# Patient Record
Sex: FEMALE | Race: BLACK OR AFRICAN AMERICAN
[De-identification: names, ages, dates, MRNs, and addresses within clinical notes are randomized per-mention and may not be internally consistent; named-entity substitution may affect disease eponyms.]

---

## 2019-09-14 ENCOUNTER — HOSPITAL ENCOUNTER (INPATIENT)
Dept: HOSPITAL 72 - EMR | Age: 84
LOS: 13 days | Discharge: NURSING, REHAB OR LONG-TERM CARE | DRG: 689 | End: 2019-09-27
Payer: MEDICARE

## 2019-09-14 VITALS
WEIGHT: 95 LBS | HEIGHT: 61 IN | SYSTOLIC BLOOD PRESSURE: 142 MMHG | DIASTOLIC BLOOD PRESSURE: 73 MMHG | BODY MASS INDEX: 17.94 KG/M2

## 2019-09-14 DIAGNOSIS — R13.10: ICD-10-CM

## 2019-09-14 DIAGNOSIS — Z88.8: ICD-10-CM

## 2019-09-14 DIAGNOSIS — B96.20: ICD-10-CM

## 2019-09-14 DIAGNOSIS — A04.72: ICD-10-CM

## 2019-09-14 DIAGNOSIS — R00.1: ICD-10-CM

## 2019-09-14 DIAGNOSIS — R62.7: ICD-10-CM

## 2019-09-14 DIAGNOSIS — E43: ICD-10-CM

## 2019-09-14 DIAGNOSIS — M24.50: ICD-10-CM

## 2019-09-14 DIAGNOSIS — K29.70: ICD-10-CM

## 2019-09-14 DIAGNOSIS — N39.0: Primary | ICD-10-CM

## 2019-09-14 DIAGNOSIS — I10: ICD-10-CM

## 2019-09-14 DIAGNOSIS — J45.909: ICD-10-CM

## 2019-09-14 DIAGNOSIS — G93.41: ICD-10-CM

## 2019-09-14 DIAGNOSIS — E11.9: ICD-10-CM

## 2019-09-14 DIAGNOSIS — Z79.4: ICD-10-CM

## 2019-09-14 LAB
ADD MANUAL DIFF: NO
ALBUMIN SERPL-MCNC: 3.2 G/DL (ref 3.4–5)
ALBUMIN/GLOB SERPL: 0.8 {RATIO} (ref 1–2.7)
ALP SERPL-CCNC: 121 U/L (ref 46–116)
ALT SERPL-CCNC: 11 U/L (ref 12–78)
ANION GAP SERPL CALC-SCNC: 8 MMOL/L (ref 5–15)
APPEARANCE UR: CLEAR
APTT PPP: (no result) S
AST SERPL-CCNC: 17 U/L (ref 15–37)
BASOPHILS NFR BLD AUTO: 0.5 % (ref 0–2)
BILIRUB SERPL-MCNC: 0.6 MG/DL (ref 0.2–1)
BUN SERPL-MCNC: 18 MG/DL (ref 7–18)
CALCIUM SERPL-MCNC: 9.2 MG/DL (ref 8.5–10.1)
CHLORIDE SERPL-SCNC: 106 MMOL/L (ref 98–107)
CO2 SERPL-SCNC: 27 MMOL/L (ref 21–32)
CREAT SERPL-MCNC: 0.5 MG/DL (ref 0.55–1.3)
EOSINOPHIL NFR BLD AUTO: 0.6 % (ref 0–3)
ERYTHROCYTE [DISTWIDTH] IN BLOOD BY AUTOMATED COUNT: 12.5 % (ref 11.6–14.8)
GLOBULIN SER-MCNC: 4.1 G/DL
GLUCOSE UR STRIP-MCNC: NEGATIVE MG/DL
HCT VFR BLD CALC: 42.8 % (ref 37–47)
HGB BLD-MCNC: 14.2 G/DL (ref 12–16)
KETONES UR QL STRIP: NEGATIVE
LEUKOCYTE ESTERASE UR QL STRIP: (no result)
LYMPHOCYTES NFR BLD AUTO: 24.9 % (ref 20–45)
MCV RBC AUTO: 92 FL (ref 80–99)
MONOCYTES NFR BLD AUTO: 8.1 % (ref 1–10)
NEUTROPHILS NFR BLD AUTO: 66 % (ref 45–75)
NITRITE UR QL STRIP: NEGATIVE
PH UR STRIP: 5 [PH] (ref 4.5–8)
PLATELET # BLD: 213 K/UL (ref 150–450)
POTASSIUM SERPL-SCNC: 4 MMOL/L (ref 3.5–5.1)
PROT UR QL STRIP: NEGATIVE
RBC # BLD AUTO: 4.67 M/UL (ref 4.2–5.4)
SODIUM SERPL-SCNC: 141 MMOL/L (ref 136–145)
SP GR UR STRIP: 1.01 (ref 1–1.03)
UROBILINOGEN UR-MCNC: NORMAL MG/DL (ref 0–1)
WBC # BLD AUTO: 8.9 K/UL (ref 4.8–10.8)

## 2019-09-14 PROCEDURE — 83036 HEMOGLOBIN GLYCOSYLATED A1C: CPT

## 2019-09-14 PROCEDURE — 87086 URINE CULTURE/COLONY COUNT: CPT

## 2019-09-14 PROCEDURE — 80061 LIPID PANEL: CPT

## 2019-09-14 PROCEDURE — 85025 COMPLETE CBC W/AUTO DIFF WBC: CPT

## 2019-09-14 PROCEDURE — 83605 ASSAY OF LACTIC ACID: CPT

## 2019-09-14 PROCEDURE — 36415 COLL VENOUS BLD VENIPUNCTURE: CPT

## 2019-09-14 PROCEDURE — 96361 HYDRATE IV INFUSION ADD-ON: CPT

## 2019-09-14 PROCEDURE — 85730 THROMBOPLASTIN TIME PARTIAL: CPT

## 2019-09-14 PROCEDURE — 83880 ASSAY OF NATRIURETIC PEPTIDE: CPT

## 2019-09-14 PROCEDURE — 80048 BASIC METABOLIC PNL TOTAL CA: CPT

## 2019-09-14 PROCEDURE — 84100 ASSAY OF PHOSPHORUS: CPT

## 2019-09-14 PROCEDURE — 94003 VENT MGMT INPAT SUBQ DAY: CPT

## 2019-09-14 PROCEDURE — 81003 URINALYSIS AUTO W/O SCOPE: CPT

## 2019-09-14 PROCEDURE — 99285 EMERGENCY DEPT VISIT HI MDM: CPT

## 2019-09-14 PROCEDURE — 71045 X-RAY EXAM CHEST 1 VIEW: CPT

## 2019-09-14 PROCEDURE — 82962 GLUCOSE BLOOD TEST: CPT

## 2019-09-14 PROCEDURE — 80053 COMPREHEN METABOLIC PANEL: CPT

## 2019-09-14 PROCEDURE — 87324 CLOSTRIDIUM AG IA: CPT

## 2019-09-14 PROCEDURE — 87181 SC STD AGAR DILUTION PER AGT: CPT

## 2019-09-14 PROCEDURE — 87040 BLOOD CULTURE FOR BACTERIA: CPT

## 2019-09-14 PROCEDURE — 84443 ASSAY THYROID STIM HORMONE: CPT

## 2019-09-14 PROCEDURE — 83735 ASSAY OF MAGNESIUM: CPT

## 2019-09-14 PROCEDURE — 85610 PROTHROMBIN TIME: CPT

## 2019-09-14 PROCEDURE — 96365 THER/PROPH/DIAG IV INF INIT: CPT

## 2019-09-14 PROCEDURE — 93005 ELECTROCARDIOGRAM TRACING: CPT

## 2019-09-14 PROCEDURE — 94150 VITAL CAPACITY TEST: CPT

## 2019-09-14 PROCEDURE — 87081 CULTURE SCREEN ONLY: CPT

## 2019-09-14 PROCEDURE — 82140 ASSAY OF AMMONIA: CPT

## 2019-09-14 NOTE — NUR
ED Nurse Note:

pt brought in by [facility] unit # [id number]. per EMS, pt was transferred from [facility] d/t FTT x 1 day. pt had low grade fever and was given 500mg of 
tylenol.

## 2019-09-14 NOTE — NUR
ED Nurse Note:



pt on cardiac monitor, iv line established, pt was cleaned and dressed, linens 
changed. blood and urine sample sent to lab. bed placed at lowest position. x 2 
 rails.

## 2019-09-14 NOTE — NUR
ED Nurse Note:



pt sent up with estella, emt. pt on room air, vss, pt has no belongings. pt 
shows no acute signs of distress. family at bedside.

## 2019-09-14 NOTE — EMERGENCY ROOM REPORT
History of Present Illness


General


Chief Complaint:  General Complaint


Source:  Medical Record





Present Illness


HPI


84-year-old female presents ED for evaluation.  Brought in by EMS from skilled 

nursing facility.  Reportedly having reduced appetite with possible fever at 

facility over the last few days.  Patient nonverbal at baseline.  Unable to 

provide any additional history at this time.  No reported signs of distress on 

arrival.  No nausea or vomiting.  No cough or congestion.  No other aggravating 

relieving factors.  No other associated symptoms


Allergies:  


Coded Allergies:  


     LISINOPRIL (Verified  Allergy, Unknown, 9/14/19)





Patient History


Past Medical History:  DM


Past Surgical History:  none


Pertinent Family History:  none


Social History:  Denies: smoking, alcohol use, drug use


Pregnant Now:  No


Immunizations:  UTD


Reviewed Nursing Documentation:  PMH: Agreed; PSxH: Agreed





Nursing Documentation-PMH


Past Medical History:  No History, Except For


Hx Diabetes:  Yes





Review of Systems


All Other Systems:  limited





Physical Exam





Vital Signs








  Date Time  Temp Pulse Resp B/P (MAP) Pulse Ox O2 Delivery O2 Flow Rate FiO2


 


9/14/19 21:45 99.5 52 16 142/73 (96) 97 Room Air  








Sp02 EP Interpretation:  reviewed, normal


General Appearance:  cachetic, lethargic, thin


Head:  normocephalic


Eyes:  bilateral eye normal inspection, bilateral eye PERRL


ENT:  normal ENT inspection


Neck:  normal inspection


Respiratory:  chest non-tender, lungs clear, normal breath sounds, speaking 

full sentences


Cardiovascular #1:  regular rate, rhythm, no edema


Gastrointestinal:  normal bowel sounds, non tender, soft, non-distended, no 

guarding, no rebound


Rectal:  deferred


Genitourinary:  no CVA tenderness


Musculoskeletal:  normal inspection


Neurologic:  other - nonverbal


Psychiatric:  other - nonverbal


Skin:  other - see nursing notes


Lymphatic:  normal inspection





Medical Decision Making


Diagnostic Impression:  


 Primary Impression:  


 Failure to thrive


 Qualified Codes:  R62.7 - Adult failure to thrive


 Additional Impressions:  


 Weakness


 UTI (urinary tract infection)


 Qualified Codes:  N39.0 - Urinary tract infection, site not specified


ER Course


Hospital Course 


84-year-old female presenting to ED with generalized weakness, poor appetite, 

reported fever 





Differential diagnoses include: Pneumonia, UTI, sepsis, dehydration, MI/

unstable angina, failure to thrive





Clinical course


Patient placed on stretcher.  On cardiac monitor with stable vitals.  After 

initial history and physical, I ordered labs, IV fluids, EKG, chest x-ray, 

blood cultures, UA. 





Labs - no leukocytosis, hb/hct stable, electrolytes ok, UA + bacteria


EKG - NSR, no acute ischemic changes interpreted by me 


CXR - no acute process 





IVFs + abx given. 





Case discussed with Dr Pineda and they agreed to admit patient to their service 

for further care and support





I feel this is a highly complex case requiring extensive working including EKG/

Rhythm strip, Xray/CT/US, Blood/urine lab work, repeat exams while in ED, and 

administration of strong opiates/narcotics for pain control, admission to 

hospital or close patient follow up.  





Diagnosis - failure to thrive, weakness, UTI





Patient admitted to floor in serious condition





Labs








Test


  9/14/19


22:06 9/14/19


22:10


 


Urine Color Pale yellow  


 


Urine Appearance Clear  


 


Urine pH 5 (4.5-8.0)  


 


Urine Specific Gravity


  1.010


(1.005-1.035) 


 


 


Urine Protein


  Negative


(NEGATIVE) 


 


 


Urine Glucose (UA)


  Negative


(NEGATIVE) 


 


 


Urine Ketones


  Negative


(NEGATIVE) 


 


 


Urine Blood 1+ (NEGATIVE)  


 


Urine Nitrite


  Negative


(NEGATIVE) 


 


 


Urine Bilirubin


  Negative


(NEGATIVE) 


 


 


Urine Urobilinogen


  Normal MG/DL


(0.0-1.0) 


 


 


Urine Leukocyte Esterase 3+ (NEGATIVE)  


 


Urine RBC


  0-2 /HPF (0 -


2) 


 


 


Urine WBC


  30-40 /HPF (0


- 2) 


 


 


Urine Squamous Epithelial


Cells None /LPF


(NONE/OCC) 


 


 


Urine Bacteria


  Moderate /HPF


(NONE) 


 


 


White Blood Count


  


  8.9 K/UL


(4.8-10.8)


 


Red Blood Count


  


  4.67 M/UL


(4.20-5.40)


 


Hemoglobin


  


  14.2 G/DL


(12.0-16.0)


 


Hematocrit


  


  42.8 %


(37.0-47.0)


 


Mean Corpuscular Volume  92 FL (80-99) 


 


Mean Corpuscular Hemoglobin


  


  30.5 PG


(27.0-31.0)


 


Mean Corpuscular Hemoglobin


Concent 


  33.3 G/DL


(32.0-36.0)


 


Red Cell Distribution Width


  


  12.5 %


(11.6-14.8)


 


Platelet Count


  


  213 K/UL


(150-450)


 


Mean Platelet Volume


  


  6.8 FL


(6.5-10.1)


 


Neutrophils (%) (Auto)


  


  66.0 %


(45.0-75.0)


 


Lymphocytes (%) (Auto)


  


  24.9 %


(20.0-45.0)


 


Monocytes (%) (Auto)


  


  8.1 %


(1.0-10.0)


 


Eosinophils (%) (Auto)


  


  0.6 %


(0.0-3.0)


 


Basophils (%) (Auto)


  


  0.5 %


(0.0-2.0)


 


Sodium Level


  


  141 MMOL/L


(136-145)


 


Potassium Level


  


  4.0 MMOL/L


(3.5-5.1)


 


Chloride Level


  


  106 MMOL/L


()


 


Carbon Dioxide Level


  


  27 MMOL/L


(21-32)


 


Anion Gap


  


  8 mmol/L


(5-15)


 


Blood Urea Nitrogen


  


  18 mg/dL


(7-18)


 


Creatinine


  


  0.5 MG/DL


(0.55-1.30)


 


Estimat Glomerular Filtration


Rate 


   mL/min (>60) 


 


 


Glucose Level


  


  95 MG/DL


()


 


Lactic Acid Level


  


  0.80 mmol/L


(0.4-2.0)


 


Calcium Level


  


  9.2 MG/DL


(8.5-10.1)


 


Total Bilirubin


  


  0.6 MG/DL


(0.2-1.0)


 


Aspartate Amino Transf


(AST/SGOT) 


  17 U/L (15-37) 


 


 


Alanine Aminotransferase


(ALT/SGPT) 


  11 U/L (12-78) 


 


 


Alkaline Phosphatase


  


  121 U/L


()


 


Pro-B-Type Natriuretic Peptide


  


  501 pg/mL


(0-125)


 


Total Protein


  


  7.3 G/DL


(6.4-8.2)


 


Albumin


  


  3.2 G/DL


(3.4-5.0)


 


Globulin  4.1 g/dL 


 


Albumin/Globulin Ratio  0.8 (1.0-2.7) 








EKG Diagnostic Results


Rate:  normal


Rhythm:  NSR


ST Segments:  no acute changes


ASA given to the pt in ED:  No





Rhythm Strip Diag. Results


EP Interpretation:  yes


Rhythm:  NSR, no PVC's, no ectopy





Chest X-Ray Diagnostic Results


Chest X-Ray Diagnostic Results :  


   Chest X-Ray Ordered:  Yes


   # of Views/Limited/Complete:  1 View


   Indication:  Other


   EP Interpretation:  Yes


   Interpretation:  no consolidation, no effusion, no pneumothorax, no acute 

cardiopulmonary disease


   Impression:  No acute disease


   Electronically Signed by:  Electronically signed by Isaac Frias MD





Last Vital Signs








  Date Time  Temp Pulse Resp B/P (MAP) Pulse Ox O2 Delivery O2 Flow Rate FiO2


 


9/14/19 21:45 99.5 52 16 142/73 (96) 97 Room Air  








Status:  improved


Disposition:  ADMITTED AS INPATIENT


Condition:  Serious











Isaac Frias MD Sep 14, 2019 22:34

## 2019-09-15 VITALS — SYSTOLIC BLOOD PRESSURE: 151 MMHG | DIASTOLIC BLOOD PRESSURE: 59 MMHG

## 2019-09-15 VITALS — DIASTOLIC BLOOD PRESSURE: 86 MMHG | SYSTOLIC BLOOD PRESSURE: 151 MMHG

## 2019-09-15 VITALS — DIASTOLIC BLOOD PRESSURE: 72 MMHG | SYSTOLIC BLOOD PRESSURE: 161 MMHG

## 2019-09-15 VITALS — SYSTOLIC BLOOD PRESSURE: 174 MMHG | DIASTOLIC BLOOD PRESSURE: 67 MMHG

## 2019-09-15 VITALS — SYSTOLIC BLOOD PRESSURE: 103 MMHG | DIASTOLIC BLOOD PRESSURE: 71 MMHG

## 2019-09-15 VITALS — SYSTOLIC BLOOD PRESSURE: 133 MMHG | DIASTOLIC BLOOD PRESSURE: 75 MMHG

## 2019-09-15 RX ADMIN — LOSARTAN POTASSIUM SCH MG: 25 TABLET, FILM COATED ORAL at 12:01

## 2019-09-15 RX ADMIN — HEPARIN SODIUM SCH UNITS: 5000 INJECTION INTRAVENOUS; SUBCUTANEOUS at 08:53

## 2019-09-15 RX ADMIN — INSULIN ASPART SCH UNITS: 100 INJECTION, SOLUTION INTRAVENOUS; SUBCUTANEOUS at 17:10

## 2019-09-15 RX ADMIN — SODIUM CHLORIDE SCH MLS/HR: 0.9 INJECTION INTRAVENOUS at 21:35

## 2019-09-15 RX ADMIN — INSULIN ASPART SCH UNITS: 100 INJECTION, SOLUTION INTRAVENOUS; SUBCUTANEOUS at 21:00

## 2019-09-15 RX ADMIN — HEPARIN SODIUM SCH UNITS: 5000 INJECTION INTRAVENOUS; SUBCUTANEOUS at 21:36

## 2019-09-15 RX ADMIN — INSULIN ASPART SCH UNITS: 100 INJECTION, SOLUTION INTRAVENOUS; SUBCUTANEOUS at 11:30

## 2019-09-15 NOTE — CONSULTATION
History of Present Illness


General


Date patient seen:  Sep 15, 2019


Time patient seen:  10:05


Chief Complaint:  General Complaint


Referring physician:  Dr Pineda


Reason for Consultation:  hospitalist





Present Illness


HPI


84 year, resident of skilled nursing facility, with past medical history of HTN

, asthma, diabetes mellitus, DNR status, presented with reported fever and 

reduced appetite for the several days.


Patient nonverbal at baseline and unable to provide any history.  No nausea or 

vomiting were reported.  No cough or congestion were reported.  Upon evaluation 

patient had low-grade fever 99.5 was bradycardic with a heart rate 52.  EKG 

reveals sinus bradycardia no acute ischemic changes.  Chest x-ray revealed no 

acute cardiopulmonary pathology.  Urinalysis revealed pyuria moderate bacteria 

laboratory work-up revealed no leukocytosis stable hemoglobin hematocrit.  

Stable chemistry and renal parameters.  Glucose 95.  Lactic acid 0.8.  Stable 

LFT.  proBNP 501 albumin 3.2 in the emergency department patient started on the 

IV fluids and empiric antibiotics admitted for further management.  S old female


Allergies:  


Coded Allergies:  


     LISINOPRIL (Verified  Allergy, Unknown, 9/14/19)





Medication History


Scheduled


Cyanocobalamin (Vitamin B-12), 500 MCG ORAL DAILY, (Reported)


Docusate Sodium* (Colace*), 100 MG ORAL DAILY, (Reported)


Dorzolamide Hcl/Timolol Maleat (Cosopt Eye Drops), 10 ML OP BID, (Reported)


Insulin Regular, Human* (Novolin R*), 0 SUBQ .SLIDING SCALE, (Reported)


Magnesium Hydroxide* (Milk Of Magnesia*), 30 ML ORAL DAILY, (Reported)


Mirtazapine (Remeron), 15 MG ORAL BEDTIME, (Reported)


Mirtazapine* (Remeron*), 7.5 MG ORAL BEDTIME, (Reported)


Multivitamin With Minerals (Multivitamins With Minerals*), 1 TAB ORAL DAILY, (

Reported)


Potassium Chloride (Potassium Chloride), 7.5 ML ORAL QHS, (Reported)





Scheduled PRN


Acetaminophen* (Acetaminophen 325MG Tablet*), 650 MG ORAL Q4H PRN for Mild Pain 

(Pain Scale 1-3), (Reported)


Acetaminophen* (Acetaminophen Extra Strength*), 1,000 MG ORAL Q4H PRN for 

Moderate Pain (Pain Scale 4-6), (Reported)


Na Phos,M-B/Na Phos,Di-Ba (Fleet Enema), 133 ML RC PRN PRN for Constipation, (

Reported)





Miscellaneous Medications


Bisacodyl (Dulcolax), 10 MG RC, (Reported)





Patient History


History Provided By:  Medical Record


Healthcare decision maker


Bi Miller


Resuscitation status


Do Not Resuscitate


Advanced Directive on File


No





Review of Systems


ROS Narrative


unable to obtain due to nonverbal status





Physical Exam


General Appearance:  no apparent distress, cachetic, other - awake, nonverbal 

AA female in NAD


Lines, tubes and drains:  peripheral


HEENT:  normocephalic, atraumatic, anicteric


Neck:  non-tender, supple


Respiratory/Chest:  lungs clear, no respiratory distress, no accessory muscle 

use


Cardiovascular/Chest:  bradycardia


Abdomen:  normal bowel sounds, non tender, soft


Extremities:  no calf tenderness, no edema, other - contracted BLE


Neurologic:  abnormal gait, alert - nonverbal


Musculoskeletal:  atrophy





Last 24 Hour Vital Signs








  Date Time  Temp Pulse Resp B/P (MAP) Pulse Ox O2 Delivery O2 Flow Rate FiO2


 


9/15/19 08:00 98.4 60 17 161/72 (101) 96   


 


9/15/19 04:00 97.3 52 18 151/59 (89) 99   


 


9/15/19 01:50      Room Air  


 


9/15/19 00:00 97.3 73 16 133/75 (94) 99   


 


9/14/19 23:50 99.2 56 13 140/69 100 Room Air  


 


9/14/19 21:53 99.5 56 22 142/73 98 Room Air  


 


9/14/19 21:53  56 22   Room Air  


 


9/14/19 21:45 99.5 52 16 142/73 (96) 97 Room Air  

















Intake and Output  


 


 9/14/19 9/15/19





 19:00 07:00


 


Intake Total  1000 ml


 


Output Total  450 ml


 


Balance  550 ml


 


  


 


Intake Oral  0 ml


 


IV Total  1000 ml


 


Output Urine Total  450 ml


 


# Voids  1


 


# Bowel Movements  4











Laboratory Tests








Test


  9/14/19


22:06 9/14/19


22:10


 


Urine Color Pale yellow   


 


Urine Appearance Clear   


 


Urine pH 5 (4.5-8.0)   


 


Urine Specific Gravity


  1.010


(1.005-1.035) 


 


 


Urine Protein


  Negative


(NEGATIVE) 


 


 


Urine Glucose (UA)


  Negative


(NEGATIVE) 


 


 


Urine Ketones


  Negative


(NEGATIVE) 


 


 


Urine Blood


  1+ (NEGATIVE)


H 


 


 


Urine Nitrite


  Negative


(NEGATIVE) 


 


 


Urine Bilirubin


  Negative


(NEGATIVE) 


 


 


Urine Urobilinogen


  Normal MG/DL


(0.0-1.0) 


 


 


Urine Leukocyte Esterase


  3+ (NEGATIVE)


H 


 


 


Urine RBC


  0-2 /HPF (0 -


2) 


 


 


Urine WBC


  30-40 /HPF (0


- 2)  H 


 


 


Urine Squamous Epithelial


Cells None /LPF


(NONE/OCC) 


 


 


Urine Bacteria


  Moderate /HPF


(NONE)  H 


 


 


White Blood Count


  


  8.9 K/UL


(4.8-10.8)


 


Red Blood Count


  


  4.67 M/UL


(4.20-5.40)


 


Hemoglobin


  


  14.2 G/DL


(12.0-16.0)


 


Hematocrit


  


  42.8 %


(37.0-47.0)


 


Mean Corpuscular Volume  92 FL (80-99)  


 


Mean Corpuscular Hemoglobin


  


  30.5 PG


(27.0-31.0)


 


Mean Corpuscular Hemoglobin


Concent 


  33.3 G/DL


(32.0-36.0)


 


Red Cell Distribution Width


  


  12.5 %


(11.6-14.8)


 


Platelet Count


  


  213 K/UL


(150-450)


 


Mean Platelet Volume


  


  6.8 FL


(6.5-10.1)


 


Neutrophils (%) (Auto)


  


  66.0 %


(45.0-75.0)


 


Lymphocytes (%) (Auto)


  


  24.9 %


(20.0-45.0)


 


Monocytes (%) (Auto)


  


  8.1 %


(1.0-10.0)


 


Eosinophils (%) (Auto)


  


  0.6 %


(0.0-3.0)


 


Basophils (%) (Auto)


  


  0.5 %


(0.0-2.0)


 


Sodium Level


  


  141 MMOL/L


(136-145)


 


Potassium Level


  


  4.0 MMOL/L


(3.5-5.1)


 


Chloride Level


  


  106 MMOL/L


()


 


Carbon Dioxide Level


  


  27 MMOL/L


(21-32)


 


Anion Gap


  


  8 mmol/L


(5-15)


 


Blood Urea Nitrogen


  


  18 mg/dL


(7-18)


 


Creatinine


  


  0.5 MG/DL


(0.55-1.30)  L


 


Estimat Glomerular Filtration


Rate 


   mL/min (>60)  


 


 


Glucose Level


  


  95 MG/DL


()


 


Lactic Acid Level


  


  0.80 mmol/L


(0.4-2.0)


 


Calcium Level


  


  9.2 MG/DL


(8.5-10.1)


 


Total Bilirubin


  


  0.6 MG/DL


(0.2-1.0)


 


Aspartate Amino Transf


(AST/SGOT) 


  17 U/L (15-37)


 


 


Alanine Aminotransferase


(ALT/SGPT) 


  11 U/L (12-78)


L


 


Alkaline Phosphatase


  


  121 U/L


()  H


 


Pro-B-Type Natriuretic Peptide


  


  501 pg/mL


(0-125)  H


 


Total Protein


  


  7.3 G/DL


(6.4-8.2)


 


Albumin


  


  3.2 G/DL


(3.4-5.0)  L


 


Globulin  4.1 g/dL  


 


Albumin/Globulin Ratio


  


  0.8 (1.0-2.7)


L











Microbiology








 Date/Time


Source Procedure


Growth Status


 


 


 9/14/19 22:30


Rectum  Received








Height (Feet):  5


Height (Inches):  1.00


Weight (Pounds):  101


Medications





Current Medications








 Medications


  (Trade)  Dose


 Ordered  Sig/Trent


 Route


 PRN Reason  Start Time


 Stop Time Status Last Admin


Dose Admin


 


 Acetaminophen


  (Tylenol)  650 mg  Q4H  PRN


 ORAL


 fever  9/15/19 08:30


 10/15/19 08:29   


 


 


 Ceftriaxone


 Sodium 1 gm/


 Dextrose  55 ml @ 


 110 mls/hr  Q24H


 IVPB


   9/15/19 21:00


 9/22/19 20:59   


 


 


 Dextrose


  (Dextrose 50%)  25 ml  Q30M  PRN


 IV


 Hypoglycemia  9/15/19 08:30


 10/15/19 08:29   


 


 


 Dextrose


  (Dextrose 50%)  50 ml  Q30M  PRN


 IV


 Hypoglycemia  9/15/19 08:30


 10/15/19 08:29   


 


 


 Heparin Sodium


  (Porcine)


  (Heparin 5000


 units/ml)  5,000 units  EVERY 12  HOURS


 SUBQ


   9/15/19 09:00


 10/15/19 08:59  9/15/19 08:53


 


 


 Lorazepam


  (Ativan 2mg/ml


 1ml)  0.5 mg  Q4H  PRN


 IV


 For Anxiety  9/15/19 08:30


 9/22/19 08:29   


 


 


 Morphine Sulfate


  (Morphine


 Sulfate)  1 mg  Q4H  PRN


 IVP


 For Pain  9/15/19 08:30


 9/22/19 08:29   


 


 


 Ondansetron HCl


  (Zofran)  4 mg  Q6H  PRN


 IVP


 Nausea & Vomiting  9/15/19 08:30


 10/15/19 08:29   


 


 


 Polyethylene


 Glycol


  (Miralax)  17 gm  HSPRN  PRN


 ORAL


 Constipation  9/15/19 08:30


 10/15/19 08:29   


 


 


 Zolpidem Tartrate


  (Ambien)  5 mg  HSPRN  PRN


 ORAL


 Insomnia  9/15/19 08:30


 9/22/19 08:29   


 











Assessment/Plan


Assessment/Plan:


ASSESSMENT


UTI


Cachexia, likely protein calorie malnutrition  


HTN 


Bradycardia  


Asthma 


Contractures   





PLAN OF CARE 


MS floor 


gentle IVF


empiric abx,  fup with cx


dietary eval


BP management- add Cozaar, along with Clonidine prn, optimize further as needed 


ECG this am due to berenice 


O2 HHN prn, no evidence of asthma exacerbation 


DVT prophylaxis  


BS management with SSI sensitive check HgA1c 


swallow eval 


asp precautions 


PT eval and Rx   


bowel regimen 


supportive care   








case discussed and evaluated by supervising physician











Ilsa Shin NP Sep 15, 2019 11:06

## 2019-09-15 NOTE — HISTORY AND PHYSICAL REPORT
DATE OF ADMISSION:  09/14/2019

DATE AND TIME SEEN:  09/15/2019 at 9 a.m.



CONSULTANTS:

1. Chau Fitzgerald M.D.

2. Shai Garg M.D.

3. Luciano Hyatt M.D.



CHIEF COMPLAINTS:  Failure to thrive, weakness, wheezing, UTI.



BRIEF HISTORY:  This is an 84-year-old female from [facility], presented with the above-mentioned diagnoses, admitted to

medical floor for further treatment.  Currently, slightly confused, in

bed, sleepy, not talking much.



PAST MEDICAL HISTORY:  Include failure to thrive, weakness, _____, and

diabetes.



PAST SURGICAL HISTORY:  Unknown.



MEDICATIONS:  Include ceftriaxone, heparin, Tylenol, morphine, Zofran,

lorazepam, and zolpidem.



ALLERGIES:  Lisinopril.



SOCIAL HISTORY:  No smoking.  No alcohol.  No intravenous drug

abuse.



FAMILY HISTORY:  Noncontributory.



REVIEW OF SYSTEMS:  Unavailable.



PHYSICAL EXAMINATION:

GENERAL:  Lethargic in bed, nonverbal.

VITAL SIGNS:  Temperature 98 degrees, pulse 60, respirations 17, blood

pressure 161/72.

CARDIOVASCULAR:  No murmur.

LUNGS:  Distant and clear.

ABDOMEN:  Bowel sounds positive.  Nontender and nondistended.

EXTREMITIES:  Showed no cyanosis, clubbing or edema.

NEUROLOGIC:  The patient is flaccid in bed, not following

directions.



LABORATORY DATA:  Labs at this time show CBC is normal.  BMP, creatinine

0.5.  Alkaline phosphatase 121.  BNP is 501.  Albumin 3.2.  Urinalysis

shows 3+ leukocyte esterase.



ASSESSMENT:

1. Failure to thrive.

2. Weakness.

3. Slight wheezing.

4. UTI.

5. Diabetes.

6. Malnutrition.



PLAN:  Blood sugar control.  Antibiotic per Infectious Disease.  PT, OT,

and dietary followup.  Resume home medications.  CBC and BMP in the

morning.   _____.









  ______________________________________________

  Chino Pineda D.O.





DR:  DAVIAN

D:  09/15/2019 09:47

T:  09/16/2019 03:05

JOB#:  [id number]

CC:

## 2019-09-15 NOTE — NUR
NURSE NOTES:





Patient received from EMT Juan Carlos. On room air with no s/s of SOB. Yellow urine draining via 
Hollingsworth catheter. Right forearm 20g IV intact and asymptomatic. Small 1cm skin alteration 
found on coccyx area. Photos taken and uploaded to file. Patient is nonverbal and confused. 
She is bedbound with bilateral lower extremity contractures. Patient had loose stool x1. 
C.Diff sample was obtained and sent to lab. No admission orders at time. Called Doctor Edwin 
and endorsed to night staff. Reached out to doctor Amilcar reached out as well. Family at 
bedside.

## 2019-09-15 NOTE — NUR
NURSE NOTES:

Received pt from JESUS SEAMAN at 0730. Pt is confused and non verbal. pt is in RA, no SOB or 
acute respiratory distress noted. pt has intact iv access RFA 20G SL. Pt has Hollingsworth cath in 
place and running well. a CNA fed breakfast to pt. pt is on calorie count. Dr MANZANO is 
aware about admission and put admission orders. all needs attended, bed is locked and is in 
the lowest position. call light within easy reach. will continue to monitor.

## 2019-09-15 NOTE — CONSULTATION
DATE OF CONSULTATION:  09/15/2019

CONSULTING PHYSICIAN:  Shai Garg M.D.



CHIEF COMPLAINT:  Failure to thrive.



HISTORY OF PRESENT ILLNESS:  Most of the history was obtained per chart.

The patient is nonverbal.  The patient was brought by from the nursing

facility for decreased p.o. intake and altered.



PAST MEDICAL HISTORY:  Diabetes and hypertension.



PAST SURGICAL HISTORY:  Unknown.



ALLERGIES:  No known drug allergies.



MEDICATIONS:  Please see medication reconciliation list.



SOCIAL HISTORY:  Currently lives in a nursing home.  No recent history of

tobacco, alcohol, or drug abuse.



FAMILY HISTORY:  Noncontributory.



REVIEW OF SYSTEMS:  Unable to obtain.



PHYSICAL EXAMINATION:

VITAL SIGNS:  Temperature 97.3, pulse 52, respirations 18, blood pressure

is ______.

HEENT:  Normocephalic and atraumatic.  Sclerae anicteric.

NECK:  Supple.  No evidence of obvious lymphadenopathy.

CARDIOVASCULAR:  Regular rate and rhythm.  Plus S1 and S2.  No obvious

murmur.

LUNGS:  Decreased breath sounds bilaterally based on the supine

exam.

ABDOMEN:  Soft and nontender.  No rebound.  No guarding.  No peritoneal

sign.

EXTREMITIES:  No cyanosis.  No clubbing.  No edema.



LABORATORY DATA:  White count is 8.9, hemoglobin 14, hematocrit 42,

platelets are 213,000.  Chem-7 grossly normal.



ASSESSMENT AND PLAN:  This is an 84-year-old female, brought from the

nursing home, poor historian, seems that the patient has history of

hypertension at baseline and possible diabetes.  Per nurse, the patient is

_____ overnight.  Plan to treat her UTI, may be as a cause of altered

mental status for this elderly patient.  We will order swallow evaluation

to see if patient can swallow, meanwhile we will put her on pureed with

honey thick food.  If she passes the swallow, we are going to calculate

calorie count to see how much she eats and if she is not eating enough, we

will consider doing G-tube placement at that time.



I want to thank, Dr. Chino Pineda, for this kind referral.









  ______________________________________________

  Shai Garg M.D.





DR:  ALICIA

D:  09/15/2019 07:14

T:  09/15/2019 13:05

JOB#:  [id number]

CC:  Chino Pineda D.O.

## 2019-09-15 NOTE — NUR
NURSE NOTES:





Received report from JESUS Mcclain. Patient is resting in bed with IV fluids running to right 
forearm 20G. She is nonverbal and confused. Bilateral lower extremities are contractured. 
Bilateral heels are elevated and patient is turned to the right side. Partial linen change 
is done. No s/s of pain noted. Family is at bedside. Will continue to monitor.

## 2019-09-15 NOTE — NUR
RD ASSESSMENT & RECOMMENDATIONS

SEE CARE ACTIVITY FOR COMPLETE ASSESSMENT



DAILY ESTIMATED NEEDS:

Needs based on Wound, 46kg  

30-35  kcals/kg 

9802-8079  total kcals

1.25-1.5  g protein/kg

58-69  g total protein 

25-30  mL/kg

5209-7475  total fluid mLs



NUTRITION DIAGNOSIS:

Increased kcal and pro needs r/t wound healing as evidenced by pt w/

coccyx pressure ulcer, w/ generalized mild to moderate wasting.



CURRENT DIET: Regular puree w/ HTL     





PO DIET RECOMMENDATIONS:

Liberalized Regular diet / texture per SLP  





ADDITIONAL RECOMMENDATIONS:

1) Obtain calibrated bed scale wts 

2) F/up w/ kcal count  

3) Add Ensure Enlive TID w/ meals  

4) SLP for appropriate texture  

5) Wound care: add Melvin BID + Vit C 250mg daily  

    F/up w/ WC eval

## 2019-09-16 VITALS — DIASTOLIC BLOOD PRESSURE: 80 MMHG | SYSTOLIC BLOOD PRESSURE: 127 MMHG

## 2019-09-16 VITALS — SYSTOLIC BLOOD PRESSURE: 131 MMHG | DIASTOLIC BLOOD PRESSURE: 77 MMHG

## 2019-09-16 VITALS — SYSTOLIC BLOOD PRESSURE: 162 MMHG | DIASTOLIC BLOOD PRESSURE: 69 MMHG

## 2019-09-16 VITALS — SYSTOLIC BLOOD PRESSURE: 113 MMHG | DIASTOLIC BLOOD PRESSURE: 62 MMHG

## 2019-09-16 VITALS — DIASTOLIC BLOOD PRESSURE: 61 MMHG | SYSTOLIC BLOOD PRESSURE: 149 MMHG

## 2019-09-16 VITALS — DIASTOLIC BLOOD PRESSURE: 79 MMHG | SYSTOLIC BLOOD PRESSURE: 130 MMHG

## 2019-09-16 LAB
ADD MANUAL DIFF: NO
ALBUMIN SERPL-MCNC: 2.8 G/DL (ref 3.4–5)
ALBUMIN/GLOB SERPL: 0.7 {RATIO} (ref 1–2.7)
ALP SERPL-CCNC: 103 U/L (ref 46–116)
ALT SERPL-CCNC: 9 U/L (ref 12–78)
ANION GAP SERPL CALC-SCNC: 6 MMOL/L (ref 5–15)
AST SERPL-CCNC: 15 U/L (ref 15–37)
BASOPHILS NFR BLD AUTO: 0.9 % (ref 0–2)
BILIRUB SERPL-MCNC: 0.5 MG/DL (ref 0.2–1)
BUN SERPL-MCNC: 10 MG/DL (ref 7–18)
CALCIUM SERPL-MCNC: 8.5 MG/DL (ref 8.5–10.1)
CHLORIDE SERPL-SCNC: 107 MMOL/L (ref 98–107)
CHOLEST SERPL-MCNC: 247 MG/DL (ref ?–200)
CO2 SERPL-SCNC: 27 MMOL/L (ref 21–32)
CREAT SERPL-MCNC: 0.5 MG/DL (ref 0.55–1.3)
EOSINOPHIL NFR BLD AUTO: 1 % (ref 0–3)
ERYTHROCYTE [DISTWIDTH] IN BLOOD BY AUTOMATED COUNT: 11.8 % (ref 11.6–14.8)
GLOBULIN SER-MCNC: 3.8 G/DL
HCT VFR BLD CALC: 38.5 % (ref 37–47)
HDLC SERPL-MCNC: 60 MG/DL (ref 40–60)
HGB BLD-MCNC: 12.7 G/DL (ref 12–16)
LYMPHOCYTES NFR BLD AUTO: 22.7 % (ref 20–45)
MCV RBC AUTO: 92 FL (ref 80–99)
MONOCYTES NFR BLD AUTO: 9 % (ref 1–10)
NEUTROPHILS NFR BLD AUTO: 66.4 % (ref 45–75)
PLATELET # BLD: 161 K/UL (ref 150–450)
POTASSIUM SERPL-SCNC: 3.6 MMOL/L (ref 3.5–5.1)
RBC # BLD AUTO: 4.17 M/UL (ref 4.2–5.4)
SODIUM SERPL-SCNC: 140 MMOL/L (ref 136–145)
TRIGL SERPL-MCNC: 43 MG/DL (ref 30–150)
WBC # BLD AUTO: 6.4 K/UL (ref 4.8–10.8)

## 2019-09-16 RX ADMIN — SODIUM CHLORIDE SCH MLS/HR: 0.9 INJECTION INTRAVENOUS at 20:37

## 2019-09-16 RX ADMIN — HEPARIN SODIUM SCH UNITS: 5000 INJECTION INTRAVENOUS; SUBCUTANEOUS at 08:41

## 2019-09-16 RX ADMIN — INSULIN ASPART SCH UNITS: 100 INJECTION, SOLUTION INTRAVENOUS; SUBCUTANEOUS at 16:30

## 2019-09-16 RX ADMIN — INSULIN ASPART SCH UNITS: 100 INJECTION, SOLUTION INTRAVENOUS; SUBCUTANEOUS at 11:30

## 2019-09-16 RX ADMIN — HEPARIN SODIUM SCH UNITS: 5000 INJECTION INTRAVENOUS; SUBCUTANEOUS at 20:38

## 2019-09-16 RX ADMIN — LOSARTAN POTASSIUM SCH MG: 25 TABLET, FILM COATED ORAL at 08:40

## 2019-09-16 RX ADMIN — INSULIN ASPART SCH UNITS: 100 INJECTION, SOLUTION INTRAVENOUS; SUBCUTANEOUS at 06:30

## 2019-09-16 RX ADMIN — INSULIN ASPART SCH UNITS: 100 INJECTION, SOLUTION INTRAVENOUS; SUBCUTANEOUS at 20:38

## 2019-09-16 NOTE — NUR
NURSE NOTES:

Received report from JESUS Arana. Bed in low and locked position. Provided safe environment. 
IV site noted, IV fluid infusing. Skin is warm and dry to touch. Abdomen is soft and non 
distended. Patient is on a calorie count. No s/s of pain or discomfort noted. Respiration is 
even and unlabored.  Call light is at bedside. Will continue plan of care.

## 2019-09-16 NOTE — CARDIOLOGY REPORT
--------------- APPROVED REPORT --------------





EKG Measurement

Heart [id number]

NV [id number]

[id number]

[id number]

[id number]





Normal sinus rhythm

Nonspecific T wave abnormality

Abnormal ECG

## 2019-09-16 NOTE — GI PROGRESS NOTE
Assessment/Plan


Problems:  


(1) Weakness


ICD Codes:  R53.1 - Weakness


SNOMED:  85752661


(2) Malnutrition


ICD Codes:  E46 - Unspecified protein-calorie malnutrition


SNOMED:  46614492


(3) Failure to thrive


SNOMED:  84316449


Qualifiers:  


   Qualified Codes:  R62.7 - Adult failure to thrive


Status:  unchanged


Status Narrative


Discussed with Dr. Garg


Assessment/Plan


Follow-up swallow evaluation


Continue calorie count


Consider PEG if nutritional needs are not met


Push p.o.


Strict aspiration precautions


One-to-one feeder


Consider Marinol


Follow labs





The patient was seen and examined at bedside and all new and available data was 

reviewed in the patients chart. I agree with the above findings, impression 

and plan.  (Patient seen earlier today. Signature stamp does not reflect 

patient encounter time.). - Shai Garg MD





Subjective


Subjective


Limited





Objective





Last 24 Hour Vital Signs








  Date Time  Temp Pulse Resp B/P (MAP) Pulse Ox O2 Delivery O2 Flow Rate FiO2


 


9/16/19 09:00      Room Air  


 


9/16/19 08:40    130/79    


 


9/16/19 08:00 98.6 66 20 130/79 (96) 99   


 


9/16/19 04:00 98.6 55 18 162/69 (100) 96   


 


9/16/19 00:00 98.2 60 17 149/61 (90) 96   


 


9/15/19 21:35    174/67    


 


9/15/19 21:00      Room Air  


 


9/15/19 20:00 98.3 61 16 174/67 (102) 96   


 


9/15/19 16:00 98.3 69 18 103/71 (82) 97   


 


9/15/19 12:01    151/86    


 


9/15/19 11:56 98.1 73 17 151/86 (107) 97   

















Intake and Output  


 


 9/15/19 9/16/19





 19:00 07:00


 


Intake Total 850 ml 600 ml


 


Output Total 300 ml 600 ml


 


Balance 550 ml 0 ml


 


  


 


Intake Oral 500 ml 


 


IV Total 350 ml 600 ml


 


Output Urine Total 300 ml 600 ml


 


# Voids  1


 


# Bowel Movements 4 3











Laboratory Tests








Test


  9/16/19


04:45


 


White Blood Count


  6.4 K/UL


(4.8-10.8)


 


Red Blood Count


  4.17 M/UL


(4.20-5.40)  L


 


Hemoglobin


  12.7 G/DL


(12.0-16.0)


 


Hematocrit


  38.5 %


(37.0-47.0)


 


Mean Corpuscular Volume 92 FL (80-99)  


 


Mean Corpuscular Hemoglobin


  30.5 PG


(27.0-31.0)


 


Mean Corpuscular Hemoglobin


Concent 33.0 G/DL


(32.0-36.0)


 


Red Cell Distribution Width


  11.8 %


(11.6-14.8)


 


Platelet Count


  161 K/UL


(150-450)


 


Mean Platelet Volume


  6.7 FL


(6.5-10.1)


 


Neutrophils (%) (Auto)


  66.4 %


(45.0-75.0)


 


Lymphocytes (%) (Auto)


  22.7 %


(20.0-45.0)


 


Monocytes (%) (Auto)


  9.0 %


(1.0-10.0)


 


Eosinophils (%) (Auto)


  1.0 %


(0.0-3.0)


 


Basophils (%) (Auto)


  0.9 %


(0.0-2.0)


 


Sodium Level


  140 MMOL/L


(136-145)


 


Potassium Level


  3.6 MMOL/L


(3.5-5.1)


 


Chloride Level


  107 MMOL/L


()


 


Carbon Dioxide Level


  27 MMOL/L


(21-32)


 


Anion Gap


  6 mmol/L


(5-15)


 


Blood Urea Nitrogen


  10 mg/dL


(7-18)


 


Creatinine


  0.5 MG/DL


(0.55-1.30)  L


 


Estimat Glomerular Filtration


Rate  mL/min (>60)  


 


 


Glucose Level


  87 MG/DL


()


 


Hemoglobin A1c


  5.4 %


(4.3-6.0)


 


Calcium Level


  8.5 MG/DL


(8.5-10.1)


 


Total Bilirubin


  0.5 MG/DL


(0.2-1.0)


 


Aspartate Amino Transf


(AST/SGOT) 15 U/L (15-37)


 


 


Alanine Aminotransferase


(ALT/SGPT) 9 U/L (12-78)


L


 


Alkaline Phosphatase


  103 U/L


()


 


Total Protein


  6.6 G/DL


(6.4-8.2)


 


Albumin


  2.8 G/DL


(3.4-5.0)  L


 


Globulin 3.8 g/dL  


 


Albumin/Globulin Ratio


  0.7 (1.0-2.7)


L


 


Triglycerides Level


  43 MG/DL


()


 


Cholesterol Level


  247 MG/DL (<


200)  H


 


LDL Cholesterol


  171 mg/dL


(<100)  H


 


HDL Cholesterol


  60 MG/DL


(40-60)


 


Cholesterol/HDL Ratio 4.1 (3.3-4.4)  


 


Thyroid Stimulating Hormone


(TSH) 0.673 uiU/mL


(0.358-3.740)








Height (Feet):  5


Height (Inches):  1.00


Weight (Pounds):  101


General Appearance:  alert


Cardiovascular:  normal rate


Respiratory/Chest:  normal breath sounds


Abdominal Exam:  soft


Genitourinary/Rectal:  normal rectal exam


Extremities:  normal range of motion


Objective


Reported by the RN the patient tolerated approximately 25% of her meals











Malachi Mcqueen NP Sep 16, 2019 11:12

## 2019-09-16 NOTE — NUR
HAND-OFF: 

Report given to JESUS Arana. Patient transferred to  room 409-1. IV fluids transferred with. 
Patient is in stable condition.

## 2019-09-16 NOTE — GENERAL PROGRESS NOTE
Assessment/Plan


Problem List:  


(1) Diabetes


ICD Codes:  E11.9 - Type 2 diabetes mellitus without complications


SNOMED:  83777873


(2) Malnutrition


ICD Codes:  E46 - Unspecified protein-calorie malnutrition


SNOMED:  34429758


(3) Failure to thrive


SNOMED:  05525741


Qualifiers:  


   Qualified Codes:  R62.7 - Adult failure to thrive


(4) Weakness


ICD Codes:  R53.1 - Weakness


SNOMED:  14924310


(5) UTI (urinary tract infection)


ICD Codes:  N39.0 - Urinary tract infection, site not specified


SNOMED:  37044083


Qualifiers:  


   Qualified Codes:  N39.0 - Urinary tract infection, site not specified


Status:  stable, progressing


Assessment/Plan:


pt diet abx cbc bmp am





Subjective


Constitutional:  Reports: weakness


Allergies:  


Coded Allergies:  


     LISINOPRIL (Verified  Allergy, Unknown, 9/14/19)


All Systems:  reviewed and negative except above


Subjective


calm in bed





Objective





Last 24 Hour Vital Signs








  Date Time  Temp Pulse Resp B/P (MAP) Pulse Ox O2 Delivery O2 Flow Rate FiO2


 


9/16/19 08:40    130/79    


 


9/16/19 08:00 98.6 66 20 130/79 (96) 99   


 


9/16/19 04:00 98.6 55 18 162/69 (100) 96   


 


9/16/19 00:00 98.2 60 17 149/61 (90) 96   


 


9/15/19 21:35    174/67    


 


9/15/19 21:00      Room Air  


 


9/15/19 20:00 98.3 61 16 174/67 (102) 96   


 


9/15/19 16:00 98.3 69 18 103/71 (82) 97   


 


9/15/19 12:01    151/86    


 


9/15/19 11:56 98.1 73 17 151/86 (107) 97   

















Intake and Output  


 


 9/15/19 9/16/19





 19:00 07:00


 


Intake Total 850 ml 600 ml


 


Output Total 300 ml 600 ml


 


Balance 550 ml 0 ml


 


  


 


Intake Oral 500 ml 


 


IV Total 350 ml 600 ml


 


Output Urine Total 300 ml 600 ml


 


# Voids  1


 


# Bowel Movements 4 3








Laboratory Tests


9/16/19 04:00: Hemoglobin A1c [Pending]


9/16/19 04:45: 


White Blood Count 6.4, Red Blood Count 4.17L, Hemoglobin 12.7, Hematocrit 38.5, 

Mean Corpuscular Volume 92, Mean Corpuscular Hemoglobin 30.5, Mean Corpuscular 

Hemoglobin Concent 33.0, Red Cell Distribution Width 11.8, Platelet Count 161, 

Mean Platelet Volume 6.7, Neutrophils (%) (Auto) 66.4, Lymphocytes (%) (Auto) 

22.7, Monocytes (%) (Auto) 9.0, Eosinophils (%) (Auto) 1.0, Basophils (%) (Auto

) 0.9, Sodium Level 140, Potassium Level 3.6, Chloride Level 107, Carbon 

Dioxide Level 27, Anion Gap 6, Blood Urea Nitrogen 10, Creatinine 0.5L, Estimat 

Glomerular Filtration Rate , Glucose Level 87, Calcium Level 8.5, Total 

Bilirubin 0.5, Aspartate Amino Transf (AST/SGOT) 15, Alanine Aminotransferase (

ALT/SGPT) 9L, Alkaline Phosphatase 103, Total Protein 6.6, Albumin 2.8L, 

Globulin 3.8, Albumin/Globulin Ratio 0.7L, Triglycerides Level 43, Cholesterol 

Level 247H, LDL Cholesterol 171H, HDL Cholesterol 60, Cholesterol/HDL Ratio 4.1

, Thyroid Stimulating Hormone (TSH) 0.673


Height (Feet):  5


Height (Inches):  1.00


Weight (Pounds):  101


General Appearance:  lethargic


EENT:  normal ENT inspection


Neck:  normal alignment


Cardiovascular:  normal peripheral pulses, normal rate, regular rhythm


Respiratory/Chest:  chest wall non-tender, lungs clear, normal breath sounds


Abdomen:  normal bowel sounds, non tender, soft


Extremities:  normal inspection


Edema:  no edema noted Arm (L), no edema noted Arm (R), no edema noted Leg (L), 

no edema noted Leg (R), no edema noted Pedal (L), no edema noted Pedal (R), no 

edema noted Generalized


Neurologic:  responsive, motor weakness


Skin:  normal pigmentation, warm/dry











Chino Pineda DO Sep 16, 2019 09:41

## 2019-09-16 NOTE — NUR
NURSE NOTES:

Received report from Fawad. Bed in low and locked position. Provided safe environment. IV 
site noted, IV fluid infusing. Changed IV fluid. Skin is warm and dry to touch. Abdomen is 
soft and non distended. Patient is on a calorie count. No s/s of pain or discomfort noted. 
Respiration is even and unlabored. NO belongings noted. Call light is at bedside. Will 
continue plan of care.

## 2019-09-16 NOTE — NUR
SWALLOW/SPEECH THERAPY NOTE:



REFERRED FOR SWALLOW EVAL BY DR MANZANO, SEE FULL REPORT TO FOLLOW.



DYSPHAGIA RISK FACTORS FOR THIS 85 YO ADVANCED AGED FEMALE:



ACUTE ISSUES:  FTT, POOR INTAKE, FEVER, WEAK, UTI, ALOC, LUNGS CLEAR NOW 

ON CXR, LOW PULSE HIGH BP, RR 17/18/20 ON ROOM AIR 



RELEVANT MEDS: ATIVAN, ALBUTEROL



H/O OROPHARYNGEAL DYSPHAGIA, PROTEIN-CALORIE MALNUTRITION, MDD, EARLY 

ONSET ALZHEIMER'S DZ DEMENTIA, BRONCHITIS, RESPIRATORY D/O, HTN, ASTHMA, 

MUSCLE WEAKNESS, AND DM2. 



POLST STATES NO TUBE FEEDINGS.

AT SNF ON A CCHO SOFT BITE SIZE DIET AND THIN LIQUIDS NONFAT MILK

CURRENTLY ON A PUREED DIET AND HONEY THICK LIQUIDS WITH POOR INTAKE 

25/25/50%. PER JESUS COSTA NOT VERY RECEPTIVE TO CRUSHED MEDS WITH PUREED.  

PER RD NORMAL WEIGHT POOR INTAKE SEND LIBERALIZE DIET AND SEND ENSURE 

ENLIVE TID.  PER DTR SHE MAY BE CLOSING EYES AND NOT TALKING BECAUSE OF HER CONFUSION FROM 
DEMENTIA. PT'S SON TO BRING IN [place] FOOD AFTER WORK.

DTR WILLING TO SEND UBER EATS IF CNA WHO SPEAKS [place] CAN FEED HER (NONE AVAILABLE PER 
RN ONLY ETIOPIAN RNS THAT CANNOT BE FEEDERS)



ALERT BUT KEEPS HER EYES CLOSED. ROOM AIR NONVERBAL



INITIAL IMPRESSIONS:

REFUSING PO INTAKE BY CLOSING HER MOUTH OR CLENCHING AND GRINDING HER TEETH (BRUXISM) HAS 
ADEQUATE DENTITION BUT NEED CLEANING



S/S OF POSSIBLE DYSPHAGIA, TAKES 5 SECONDS TO SWALLOW THIN LIQUIDS AND NECTAR THICK LIQUIDS 
(HOLDS IN HER MOUTH), FAIR HYOLARYNGEAL EXCURSION,

DOES NOT ALLOW ORAL INSPECTION, NO OVERT ASPIRATION BUT HAS SILENT ASPIRATION RISK DUE TO 
DEMENTIA DX



POOR AND SLOW INTAKE AND MAY REFUSE PO



RECOMMENDATIONS

SINCE FAMILY WANTS COMFORT FEEDING (NO TUBE FEEDINGS) FOR QUALITY OF LIFE, CONSIDER 
DOWNGRADING HER TO LIQUIFIED PUREED LIKE NECTAR THICK SOUP CONSISTENCY TSP ONLY WITH POSTED 
ASPIRATION PRECAUTIONS



ORAL SPILLAGE ON THE RIGHT WITH TSP (CANNOT OR DID NOT USE STRAW/CUP).



DID NOT OPEN MOUTH TO TAKE PUREED TSP CONSISTENCY



ALLOW SON/FAMILY TO GIVE [employer] FOODS OF PREFERENCE OF NECTAR THICK LIQUID CONSISTENCY IF 
POSSIBLE. 



PER RD LIBERALIZE DIET AND SEND ENSURE ENLIVE TID



EDUCATED/TRAINED JESUS COSTA IN POSTED PRECAUTIONS



SKILLED DYSPHAGIA MANAGEMENT AND TX AND COG-COM EVAL/TX  

-------------------------------------------------------------------------------

Addendum: 09/16/19 at 1143 by CARLOZ MENDEZ

-------------------------------------------------------------------------------

COMPLETE MOD BARIUM SWALLOW STUDY IF RECEPTIVE TO PO TRIALS



SPEAKS Martiniquais USUALLY

## 2019-09-16 NOTE — PULMONOLOGY PROGRESS NOTE
Assessment/Plan


Problems:  


(1) Failure to thrive


(2) At high risk for aspiration


(3) Diabetes


(4) Protein-calorie malnutrition, severe


(5) Limited mobility in bed


(6) Flexion contractures


Assessment/Plan


aspiration precaution


symptomatic treatment


sliding scale


diabetic diet


check electrolytes


titrate fio2 to sat of 92%





Subjective


ROS Limited/Unobtainable:  No


Constitutional:  Reports: no symptoms


HEENT:  Repors: no symptoms


Respiratory:  Reports: no symptoms


Allergies:  


Coded Allergies:  


     LISINOPRIL (Verified  Allergy, Unknown, 9/14/19)





Objective





Last 24 Hour Vital Signs








  Date Time  Temp Pulse Resp B/P (MAP) Pulse Ox O2 Delivery O2 Flow Rate FiO2


 


9/16/19 09:00      Room Air  


 


9/16/19 08:40    130/79    


 


9/16/19 08:00 98.6 66 20 130/79 (96) 99   


 


9/16/19 04:00 98.6 55 18 162/69 (100) 96   


 


9/16/19 00:00 98.2 60 17 149/61 (90) 96   


 


9/15/19 21:35    174/67    


 


9/15/19 21:00      Room Air  


 


9/15/19 20:00 98.3 61 16 174/67 (102) 96   


 


9/15/19 16:00 98.3 69 18 103/71 (82) 97   

















Intake and Output  


 


 9/15/19 9/16/19





 19:00 07:00


 


Intake Total 850 ml 600 ml


 


Output Total 300 ml 600 ml


 


Balance 550 ml 0 ml


 


  


 


Intake Oral 500 ml 


 


IV Total 350 ml 600 ml


 


Output Urine Total 300 ml 600 ml


 


# Voids  1


 


# Bowel Movements 4 3








General Appearance:  cachetic


Respiratory/Chest:  chest wall non-tender, lungs clear


Breasts:  no masses


Cardiovascular:  normal peripheral pulses, normal rate


Abdomen:  normal bowel sounds, soft, non tender


Genitourinary:  normal external genitalia


Extremities:  no cyanosis


Neurologic/Psychiatric:  CNs II-XII grossly normal, aphasia





Microbiology








 Date/Time


Source Procedure


Growth Status


 


 


 9/14/19 22:27


Blood Blood Culture - Preliminary


NO GROWTH AFTER 24 HOURS Resulted


 


 9/14/19 22:06


Blood Blood Culture - Preliminary


NO GROWTH AFTER 24 HOURS Resulted


 


 9/15/19 04:55


Stool Clostridium difficile Toxin Assay - Final Complete


 


 9/14/19 22:06


Urine,Clean Catch Urine Culture - Preliminary


Gram Negative Bacillus 1 Resulted


 


 9/14/19 22:30


Rectum  Received








Laboratory Tests


9/16/19 04:45: 


White Blood Count 6.4, Red Blood Count 4.17L, Hemoglobin 12.7, Hematocrit 38.5, 

Mean Corpuscular Volume 92, Mean Corpuscular Hemoglobin 30.5, Mean Corpuscular 

Hemoglobin Concent 33.0, Red Cell Distribution Width 11.8, Platelet Count 161, 

Mean Platelet Volume 6.7, Neutrophils (%) (Auto) 66.4, Lymphocytes (%) (Auto) 

22.7, Monocytes (%) (Auto) 9.0, Eosinophils (%) (Auto) 1.0, Basophils (%) (Auto

) 0.9, Sodium Level 140, Potassium Level 3.6, Chloride Level 107, Carbon 

Dioxide Level 27, Anion Gap 6, Blood Urea Nitrogen 10, Creatinine 0.5L, Estimat 

Glomerular Filtration Rate , Glucose Level 87, Hemoglobin A1c 5.4, Calcium 

Level 8.5, Total Bilirubin 0.5, Aspartate Amino Transf (AST/SGOT) 15, Alanine 

Aminotransferase (ALT/SGPT) 9L, Alkaline Phosphatase 103, Total Protein 6.6, 

Albumin 2.8L, Globulin 3.8, Albumin/Globulin Ratio 0.7L, Triglycerides Level 43

, Cholesterol Level 247H, LDL Cholesterol 171H, HDL Cholesterol 60, Cholesterol/

HDL Ratio 4.1, Thyroid Stimulating Hormone (TSH) 0.673





Current Medications








 Medications


  (Trade)  Dose


 Ordered  Sig/Tretn


 Route


 PRN Reason  Start Time


 Stop Time Status Last Admin


Dose Admin


 


 Acetaminophen


  (Tylenol)  650 mg  Q4H  PRN


 ORAL


 fever  9/15/19 08:30


 10/15/19 08:29   


 


 


 Albuterol/


 Ipratropium


  (Albuterol/


 Ipratropium)  3 ml  Q4H  PRN


 HHN


 sob  9/15/19 11:15


 9/20/19 11:14   


 


 


 Ceftriaxone


 Sodium 1 gm/


 Dextrose  55 ml @ 


 110 mls/hr  Q24H


 IVPB


   9/15/19 21:00


 9/22/19 20:59  9/15/19 21:35


 


 


 Clonidine HCl


  (Catapres Tab)  0.1 mg  Q6H  PRN


 ORAL


 sbp above 160  9/15/19 11:15


 10/15/19 11:14  9/15/19 21:35


 


 


 Dextrose


  (Dextrose 50%)  25 ml  Q30M  PRN


 IV


 Hypoglycemia  9/15/19 08:30


 10/15/19 08:29   


 


 


 Dextrose


  (Dextrose 50%)  50 ml  Q30M  PRN


 IV


 Hypoglycemia  9/15/19 08:30


 10/15/19 08:29   


 


 


 Heparin Sodium


  (Porcine)


  (Heparin 5000


 units/ml)  5,000 units  EVERY 12  HOURS


 SUBQ


   9/15/19 09:00


 10/15/19 08:59  9/16/19 08:41


 


 


 Insulin Aspart


  (NovoLOG)    BEFORE MEALS AND  HS


 SUBQ


   9/15/19 11:30


 10/15/19 11:29  9/15/19 17:10


 


 


 Lorazepam


  (Ativan 2mg/ml


 1ml)  0.5 mg  Q4H  PRN


 IV


 For Anxiety  9/15/19 08:30


 9/22/19 08:29   


 


 


 Losartan Potassium


  (Cozaar)  25 mg  DAILY


 ORAL


   9/15/19 12:00


 10/15/19 11:59  9/16/19 08:40


 


 


 Morphine Sulfate


  (Morphine


 Sulfate)  1 mg  Q4H  PRN


 IVP


 For Pain  9/15/19 08:30


 9/22/19 08:29   


 


 


 Ondansetron HCl


  (Zofran)  4 mg  Q6H  PRN


 IVP


 Nausea & Vomiting  9/15/19 08:30


 10/15/19 08:29   


 


 


 Polyethylene


 Glycol


  (Miralax)  17 gm  HSPRN  PRN


 ORAL


 Constipation  9/15/19 08:30


 10/15/19 08:29   


 


 


 Sodium Chloride  1,000 ml @ 


 50 mls/hr  Q20H


 IV


   9/15/19 11:15


 10/15/19 11:14  9/16/19 06:50


 


 


 Zolpidem Tartrate


  (Ambien)  5 mg  HSPRN  PRN


 ORAL


 Insomnia  9/15/19 08:30


 9/22/19 08:29   


 

















Chau Fitzgerald MD Sep 16, 2019 13:06

## 2019-09-16 NOTE — CARDIOLOGY REPORT
--------------- APPROVED REPORT --------------





EKG Measurement

Heart [id number]

VA [id number]

[id number]

[id number]

[id number]





Sinus bradycardia

Nonspecific T wave abnormality

Abnormal ECG

## 2019-09-16 NOTE — NUR
NURSE NOTES:

Notified Dr. Pineda re: pt not being on vanco or flagyl for Cdiff. Dr. Pineda will consult ID.

## 2019-09-16 NOTE — NUR
85 YO FEMALE BIBA FROM [facility] HOME TO ER



CC    FAILURE TO THRIVE



SI:    FAILURE TO THRIVE

T. 99.5 HR 52 RR 16 B/P 142/73

ALK PHOS 121 

UA+ BLOOD,LEUKOCYTE ESTERASE WBC









IS:    IV BOLUS NS X 1 LITER

*******ADMITTED TO MED/SURG @ 9240******

**********MED/SURG STATUS**********





DCP    RETURN TO [facility]

## 2019-09-17 VITALS — SYSTOLIC BLOOD PRESSURE: 150 MMHG | DIASTOLIC BLOOD PRESSURE: 74 MMHG

## 2019-09-17 VITALS — DIASTOLIC BLOOD PRESSURE: 64 MMHG | SYSTOLIC BLOOD PRESSURE: 123 MMHG

## 2019-09-17 VITALS — SYSTOLIC BLOOD PRESSURE: 146 MMHG | DIASTOLIC BLOOD PRESSURE: 80 MMHG

## 2019-09-17 VITALS — DIASTOLIC BLOOD PRESSURE: 80 MMHG | SYSTOLIC BLOOD PRESSURE: 104 MMHG

## 2019-09-17 VITALS — DIASTOLIC BLOOD PRESSURE: 85 MMHG | SYSTOLIC BLOOD PRESSURE: 152 MMHG

## 2019-09-17 VITALS — SYSTOLIC BLOOD PRESSURE: 138 MMHG | DIASTOLIC BLOOD PRESSURE: 76 MMHG

## 2019-09-17 LAB
ADD MANUAL DIFF: NO
ANION GAP SERPL CALC-SCNC: 8 MMOL/L (ref 5–15)
BASOPHILS NFR BLD AUTO: 0.5 % (ref 0–2)
BUN SERPL-MCNC: 9 MG/DL (ref 7–18)
CALCIUM SERPL-MCNC: 9 MG/DL (ref 8.5–10.1)
CHLORIDE SERPL-SCNC: 103 MMOL/L (ref 98–107)
CO2 SERPL-SCNC: 29 MMOL/L (ref 21–32)
CREAT SERPL-MCNC: 0.5 MG/DL (ref 0.55–1.3)
EOSINOPHIL NFR BLD AUTO: 0.3 % (ref 0–3)
ERYTHROCYTE [DISTWIDTH] IN BLOOD BY AUTOMATED COUNT: 11.8 % (ref 11.6–14.8)
HCT VFR BLD CALC: 43.2 % (ref 37–47)
HGB BLD-MCNC: 14.6 G/DL (ref 12–16)
LYMPHOCYTES NFR BLD AUTO: 17.2 % (ref 20–45)
MCV RBC AUTO: 93 FL (ref 80–99)
MONOCYTES NFR BLD AUTO: 9.6 % (ref 1–10)
NEUTROPHILS NFR BLD AUTO: 72.4 % (ref 45–75)
PLATELET # BLD: 177 K/UL (ref 150–450)
POTASSIUM SERPL-SCNC: 3.5 MMOL/L (ref 3.5–5.1)
RBC # BLD AUTO: 4.64 M/UL (ref 4.2–5.4)
SODIUM SERPL-SCNC: 140 MMOL/L (ref 136–145)
WBC # BLD AUTO: 7.3 K/UL (ref 4.8–10.8)

## 2019-09-17 RX ADMIN — VANCOMYCIN HYDROCHLORIDE SCH MG: 500 INJECTION, POWDER, LYOPHILIZED, FOR SOLUTION INTRAVENOUS at 17:29

## 2019-09-17 RX ADMIN — SODIUM CHLORIDE SCH MLS/HR: 0.9 INJECTION INTRAVENOUS at 20:31

## 2019-09-17 RX ADMIN — INSULIN ASPART SCH UNITS: 100 INJECTION, SOLUTION INTRAVENOUS; SUBCUTANEOUS at 06:22

## 2019-09-17 RX ADMIN — HEPARIN SODIUM SCH UNITS: 5000 INJECTION INTRAVENOUS; SUBCUTANEOUS at 20:34

## 2019-09-17 RX ADMIN — HEPARIN SODIUM SCH UNITS: 5000 INJECTION INTRAVENOUS; SUBCUTANEOUS at 08:13

## 2019-09-17 RX ADMIN — DRONABINOL SCH MG: 2.5 CAPSULE ORAL at 12:46

## 2019-09-17 RX ADMIN — VANCOMYCIN HYDROCHLORIDE SCH MG: 500 INJECTION, POWDER, LYOPHILIZED, FOR SOLUTION INTRAVENOUS at 15:26

## 2019-09-17 RX ADMIN — VANCOMYCIN HYDROCHLORIDE SCH MG: 500 INJECTION, POWDER, LYOPHILIZED, FOR SOLUTION INTRAVENOUS at 20:31

## 2019-09-17 RX ADMIN — INSULIN ASPART SCH UNITS: 100 INJECTION, SOLUTION INTRAVENOUS; SUBCUTANEOUS at 20:03

## 2019-09-17 RX ADMIN — INSULIN ASPART SCH UNITS: 100 INJECTION, SOLUTION INTRAVENOUS; SUBCUTANEOUS at 11:30

## 2019-09-17 RX ADMIN — INSULIN ASPART SCH UNITS: 100 INJECTION, SOLUTION INTRAVENOUS; SUBCUTANEOUS at 16:30

## 2019-09-17 RX ADMIN — LOSARTAN POTASSIUM SCH MG: 25 TABLET, FILM COATED ORAL at 08:09

## 2019-09-17 RX ADMIN — DRONABINOL SCH MG: 2.5 CAPSULE ORAL at 17:29

## 2019-09-17 NOTE — NUR
NURSE NOTES:

patient received in bed, awake, slightly restless, repositioned for comfort. [place] cath 
draining via gravity. IV leaking, new IV access obtained. Will continue plan of care.

## 2019-09-17 NOTE — NUR
DISCHARGE SWALLOW/SPEECH THERAPY NOTE:



The patient is not receptive to po intake with SLP now. Per andrea cueva, pt is only taking 25% 
of liquified pureed like nectar thick soup consistency w/o overt aspiration. The patient 
will close her mouth when she has had 

enough. It is possible that the patient is getting Romanian food from her 

son after work; however, the CNA has not seen him here up until 7:30pm.



Per Dr. Garg, the family is not receptive to nonoral feedings. The 

patient started to get Marinol, appetite stimulant, today. The patient 

will be d/c to SNF soon. Goals met for staff (odilia mendez and anay mendez) 

aware/educated in posted aspiration precautions.



Will hold off on modified barium swallow study for now since pt refuses po 

many times.



Plan:

d/c from skilled ST services for now and continue with diet/liquids per 

recommendations.

f/up with slp at SNF and outpt mod barium swallow study only if pt is 

consistently receptive to po intake.

## 2019-09-17 NOTE — CONSULTATION
History of Present Illness


General


Date patient seen:  Sep 17, 2019


Chief Complaint:  General Complaint


Referring physician:  Dr Pineda


Reason for Consultation:  hospitalist





Present Illness


HPI


Ms Hannah is an 85 yo female with PMHx of DM and failure  to thrive who was 

admitted to the hospital on 9/14/19. She was sent to the ED from her nursing 

home for decreased appetite and possible fevers.She is non verbal at baseline 

so the history is limited and obtained form the charts. She was afebrile and 

had no leukocytosis in the ED. She did have a positive UA and her urine Cx had 

grown E. coli. She developed Diarrhea as well and had 7 BMs yesterday. C. diff 

assay for toxin was positive.





ID was consulted for C. diff infection





PMHx/PSHx


DM


Failure to thrive





SocHx


Unable to obtain as patient not verbal





FamxHx


Unable to obtain as patient not verbal








Allergies:  


Coded Allergies:  


     LISINOPRIL (Verified  Allergy, Unknown, 9/14/19)





Medication History


Scheduled


Cyanocobalamin (Vitamin B-12), 500 MCG ORAL DAILY, (Reported)


Docusate Sodium* (Colace*), 100 MG ORAL DAILY, (Reported)


Dorzolamide Hcl/Timolol Maleat (Cosopt Eye Drops), 10 ML OP BID, (Reported)


Insulin Regular, Human* (Novolin R*), 0 SUBQ .SLIDING SCALE, (Reported)


Magnesium Hydroxide* (Milk Of Magnesia*), 30 ML ORAL DAILY, (Reported)


Mirtazapine (Remeron), 15 MG ORAL BEDTIME, (Reported)


Mirtazapine* (Remeron*), 7.5 MG ORAL BEDTIME, (Reported)


Multivitamin With Minerals (Multivitamins With Minerals*), 1 TAB ORAL DAILY, (

Reported)


Potassium Chloride (Potassium Chloride), 7.5 ML ORAL QHS, (Reported)





Scheduled PRN


Acetaminophen* (Acetaminophen 325MG Tablet*), 650 MG ORAL Q4H PRN for Mild Pain 

(Pain Scale 1-3), (Reported)


Acetaminophen* (Acetaminophen Extra Strength*), 1,000 MG ORAL Q4H PRN for 

Moderate Pain (Pain Scale 4-6), (Reported)


Na Phos,M-B/Na Phos,Di-Ba (Fleet Enema), 133 ML RC PRN PRN for Constipation, (

Reported)





Miscellaneous Medications


Bisacodyl (Dulcolax), 10 MG RC, (Reported)





Patient History


Healthcare decision maker


iB Miller


Resuscitation status


Do Not Resuscitate


Advanced Directive on File


No





Review of Systems


ROS Narrative


Unable to obtain as patient not verbal





Physical Exam





Last 24 Hour Vital Signs








  Date Time  Temp Pulse Resp B/P (MAP) Pulse Ox O2 Delivery O2 Flow Rate FiO2


 


9/17/19 09:00      Room Air  


 


9/17/19 08:09    152/85    


 


9/17/19 08:00 98.2 73 20 146/80 (102) 100   


 


9/17/19 04:00 98.2 70 19 152/85 (107) 100   


 


9/17/19 00:00 97.7 69 19 150/74 (99) 100   


 


9/16/19 21:00      Room Air  


 


9/16/19 20:00 98.1 72 16 113/62 (79) 100   


 


9/16/19 16:00 98.4 75 18 131/77 (95) 98   


 


9/16/19 12:00 98.3 70 18 127/80 (96) 98   

















Intake and Output  


 


 9/16/19 9/17/19





 19:00 07:00


 


Intake Total 470 ml 605 ml


 


Output Total 600 ml 700 ml


 


Balance -130 ml -95 ml


 


  


 


Intake Oral 420 ml 


 


IV Total 50 ml 605 ml


 


Output Urine Total 600 ml 700 ml


 


# Bowel Movements 3 1











Laboratory Tests








Test


  9/17/19


05:40


 


White Blood Count


  7.3 K/UL


(4.8-10.8)


 


Red Blood Count


  4.64 M/UL


(4.20-5.40)


 


Hemoglobin


  14.6 G/DL


(12.0-16.0)


 


Hematocrit


  43.2 %


(37.0-47.0)


 


Mean Corpuscular Volume 93 FL (80-99)  


 


Mean Corpuscular Hemoglobin


  31.4 PG


(27.0-31.0)  H


 


Mean Corpuscular Hemoglobin


Concent 33.7 G/DL


(32.0-36.0)


 


Red Cell Distribution Width


  11.8 %


(11.6-14.8)


 


Platelet Count


  177 K/UL


(150-450)


 


Mean Platelet Volume


  7.2 FL


(6.5-10.1)


 


Neutrophils (%) (Auto)


  72.4 %


(45.0-75.0)


 


Lymphocytes (%) (Auto)


  17.2 %


(20.0-45.0)  L


 


Monocytes (%) (Auto)


  9.6 %


(1.0-10.0)


 


Eosinophils (%) (Auto)


  0.3 %


(0.0-3.0)


 


Basophils (%) (Auto)


  0.5 %


(0.0-2.0)


 


Sodium Level


  140 MMOL/L


(136-145)


 


Potassium Level


  3.5 MMOL/L


(3.5-5.1)


 


Chloride Level


  103 MMOL/L


()


 


Carbon Dioxide Level


  29 MMOL/L


(21-32)


 


Anion Gap


  8 mmol/L


(5-15)


 


Blood Urea Nitrogen


  9 mg/dL (7-18)


 


 


Creatinine


  0.5 MG/DL


(0.55-1.30)  L


 


Estimat Glomerular Filtration


Rate  mL/min (>60)  


 


 


Glucose Level


  86 MG/DL


()


 


Calcium Level


  9.0 MG/DL


(8.5-10.1)








Height (Feet):  5


Height (Inches):  1.00


Weight (Pounds):  101


Medications





Current Medications








 Medications


  (Trade)  Dose


 Ordered  Sig/Trent


 Route


 PRN Reason  Start Time


 Stop Time Status Last Admin


Dose Admin


 


 Acetaminophen


  (Tylenol)  650 mg  Q4H  PRN


 ORAL


 fever  9/15/19 08:30


 10/15/19 08:29   


 


 


 Albuterol/


 Ipratropium


  (Albuterol/


 Ipratropium)  3 ml  Q4H  PRN


 HHN


 sob  9/15/19 11:15


 9/20/19 11:14   


 


 


 Ceftriaxone


 Sodium 1 gm/


 Dextrose  55 ml @ 


 110 mls/hr  Q24H


 IVPB


   9/15/19 21:00


 9/22/19 20:59  9/16/19 20:37


 


 


 Clonidine HCl


  (Catapres Tab)  0.1 mg  Q6H  PRN


 ORAL


 sbp above 160  9/15/19 11:15


 10/15/19 11:14  9/15/19 21:35


 


 


 Dextrose


  (Dextrose 50%)  25 ml  Q30M  PRN


 IV


 Hypoglycemia  9/15/19 08:30


 10/15/19 08:29   


 


 


 Dextrose


  (Dextrose 50%)  50 ml  Q30M  PRN


 IV


 Hypoglycemia  9/15/19 08:30


 10/15/19 08:29   


 


 


 Dronabinol


  (Marinol)  2.5 mg  TID


 ORAL


   9/17/19 13:00


 10/17/19 12:59   


 


 


 Heparin Sodium


  (Porcine)


  (Heparin 5000


 units/ml)  5,000 units  EVERY 12  HOURS


 SUBQ


   9/15/19 09:00


 10/15/19 08:59  9/17/19 08:13


 


 


 Insulin Aspart


  (NovoLOG)    BEFORE MEALS AND  HS


 SUBQ


   9/15/19 11:30


 10/15/19 11:29  9/15/19 17:10


 


 


 Lorazepam


  (Ativan 2mg/ml


 1ml)  0.5 mg  Q4H  PRN


 IV


 For Anxiety  9/15/19 08:30


 9/22/19 08:29   


 


 


 Losartan Potassium


  (Cozaar)  25 mg  DAILY


 ORAL


   9/15/19 12:00


 10/15/19 11:59  9/17/19 08:09


 


 


 Morphine Sulfate


  (Morphine


 Sulfate)  1 mg  Q4H  PRN


 IVP


 For Pain  9/15/19 08:30


 9/22/19 08:29   


 


 


 Ondansetron HCl


  (Zofran)  4 mg  Q6H  PRN


 IVP


 Nausea & Vomiting  9/15/19 08:30


 10/15/19 08:29   


 


 


 Polyethylene


 Glycol


  (Miralax)  17 gm  HSPRN  PRN


 ORAL


 Constipation  9/15/19 08:30


 10/15/19 08:29   


 


 


 Sodium Chloride  1,000 ml @ 


 50 mls/hr  Q20H


 IV


   9/15/19 11:15


 10/15/19 11:14  9/17/19 03:31


 


 


 Zolpidem Tartrate


  (Ambien)  5 mg  HSPRN  PRN


 ORAL


 Insomnia  9/15/19 08:30


 9/22/19 08:29   


 








Objective Narrative


GEN: NAD, Satting well


HEENT: NCAT, MMM, No scleral icterus


CHEST: CTAB, No W/C


HEART: RRR, S1, S2, 


Abd: Soft, NT, ND, +BS, No HSM


Ext No C/C/E, Pulse 2+ B/L (DP and Rad)


Neuro Awake, not verbal 


Skin, Warm, no rashes





Assessment/Plan


Assessment/Plan:


85 yo female with PMHx of DM and failure  to thrive who was admitted to the 

hospital on 9/14/19. 





C. diff Diarrhea


   Toxin assay Pos 9/16/19


  


UTI


   UA (+)


   Urine Cx 9/14/19 - E,. coli (Sen to cefazolin)








DM


Failure to thrive





Plan





- Start PO Vancomycin





- Continue Ceftriaxone #3/5





- Monitor BMs





- Monitor CBC and Temps





Thank you for this consult. We will continue to follow the patient with you.











Truong Woods MD Sep 17, 2019 11:38

## 2019-09-17 NOTE — NUR
RD ASSESSMENT & RECOMMENDATIONS

SEE CARE ACTIVITY FOR COMPLETE ASSESSMENT



DAILY ESTIMATED NEEDS:

Needs based on Wound, 46kg  

30-35  kcals/kg 

0394-5600  total kcals

1.25-1.5  g protein/kg

58-69  g total protein 

25-30  mL/kg

4123-2694  total fluid mLs



NUTRITION DIAGNOSIS:

Increased kcal and pro needs r/t wound healing as evidenced by pt w/

coccyx pressure ulcer, w/ generalized moderate to severe wasting.



CURRENT DIET: Regular puree w/ HTL     





PO DIET RECOMMENDATIONS:

Liberalized Regular diet / texture per SLP  



ENTERAL NUTRITION RECOMMENDATIONS:

Osmolite 1.2 @48ml/hr x24 hrs   to provide 1152ml, 1382 kcal, 64g pro, 945ml free H2O  



- As medically indicated, obtain GI access, start Osmolite 1.2 @18ml/hr for 6 hrs.

- Advance as tolerated 10ml/hr q4-6 hrs to goal rate.

- Flush per MD, HOB over 30 degrees

- Monitor BG and need for carb control TF / NISS







ADDITIONAL RECOMMENDATIONS:

1) Obtain calibrated bed scale wts 

2) F/up w/ kcal count- incomplete  

3) Add Ensure Enlive TID w/ meals  

4) SLP for appropriate texture  

5) Wound care: add Melvin BID + Vit C 250mg daily  

     F/up w/ WC eval  

6) C-diff +, check lytes daily

## 2019-09-17 NOTE — GI PROGRESS NOTE
Assessment/Plan


Problems:  


(1) Weakness


ICD Codes:  R53.1 - Weakness


SNOMED:  75032852


(2) Malnutrition


ICD Codes:  E46 - Unspecified protein-calorie malnutrition


SNOMED:  02353079


(3) Failure to thrive


SNOMED:  30920066


Qualifiers:  


   Qualified Codes:  R62.7 - Adult failure to thrive


Status:  unchanged


Status Narrative


Discussed with Dr. Garg..


Assessment/Plan


Calorie count reviewed nutritional needs are not met


Family refuses PEG


Push p.o.


Strict aspiration precautions


One-to-one feeder


Add Marinol


Follow labs


dc planning





The patient was seen and examined at bedside and all new and available data was 

reviewed in the patients chart. I agree with the above findings, impression 

and plan.  (Patient seen earlier today. Signature stamp does not reflect 

patient encounter time.). - Shai Garg MD





Subjective


Subjective


Limited





Objective





Last 24 Hour Vital Signs








  Date Time  Temp Pulse Resp B/P (MAP) Pulse Ox O2 Delivery O2 Flow Rate FiO2


 


9/17/19 09:00      Room Air  


 


9/17/19 08:09    152/85    


 


9/17/19 08:00 98.2 73 20 146/80 (102) 100   


 


9/17/19 04:00 98.2 70 19 152/85 (107) 100   


 


9/17/19 00:00 97.7 69 19 150/74 (99) 100   


 


9/16/19 21:00      Room Air  


 


9/16/19 20:00 98.1 72 16 113/62 (79) 100   


 


9/16/19 16:00 98.4 75 18 131/77 (95) 98   


 


9/16/19 12:00 98.3 70 18 127/80 (96) 98   

















Intake and Output  


 


 9/16/19 9/17/19





 19:00 07:00


 


Intake Total 470 ml 605 ml


 


Output Total 600 ml 700 ml


 


Balance -130 ml -95 ml


 


  


 


Intake Oral 420 ml 


 


IV Total 50 ml 605 ml


 


Output Urine Total 600 ml 700 ml


 


# Bowel Movements 3 1











Laboratory Tests








Test


  9/17/19


05:40


 


White Blood Count


  7.3 K/UL


(4.8-10.8)


 


Red Blood Count


  4.64 M/UL


(4.20-5.40)


 


Hemoglobin


  14.6 G/DL


(12.0-16.0)


 


Hematocrit


  43.2 %


(37.0-47.0)


 


Mean Corpuscular Volume 93 FL (80-99)  


 


Mean Corpuscular Hemoglobin


  31.4 PG


(27.0-31.0)  H


 


Mean Corpuscular Hemoglobin


Concent 33.7 G/DL


(32.0-36.0)


 


Red Cell Distribution Width


  11.8 %


(11.6-14.8)


 


Platelet Count


  177 K/UL


(150-450)


 


Mean Platelet Volume


  7.2 FL


(6.5-10.1)


 


Neutrophils (%) (Auto)


  72.4 %


(45.0-75.0)


 


Lymphocytes (%) (Auto)


  17.2 %


(20.0-45.0)  L


 


Monocytes (%) (Auto)


  9.6 %


(1.0-10.0)


 


Eosinophils (%) (Auto)


  0.3 %


(0.0-3.0)


 


Basophils (%) (Auto)


  0.5 %


(0.0-2.0)


 


Sodium Level


  140 MMOL/L


(136-145)


 


Potassium Level


  3.5 MMOL/L


(3.5-5.1)


 


Chloride Level


  103 MMOL/L


()


 


Carbon Dioxide Level


  29 MMOL/L


(21-32)


 


Anion Gap


  8 mmol/L


(5-15)


 


Blood Urea Nitrogen


  9 mg/dL (7-18)


 


 


Creatinine


  0.5 MG/DL


(0.55-1.30)  L


 


Estimat Glomerular Filtration


Rate  mL/min (>60)  


 


 


Glucose Level


  86 MG/DL


()


 


Calcium Level


  9.0 MG/DL


(8.5-10.1)








Height (Feet):  5


Height (Inches):  1.00


Weight (Pounds):  101


General Appearance:  WD/WN, no apparent distress, alert, thin


Cardiovascular:  normal rate


Respiratory/Chest:  normal breath sounds, no respiratory distress


Abdominal Exam:  normal bowel sounds, non tender, soft


Extremities:  non-tender


Objective


Reported by the RN the patient tolerated approximately 25% of her meals











Malachi Mcqueen NP Sep 17, 2019 11:16

## 2019-09-17 NOTE — PULMONOLOGY PROGRESS NOTE
Assessment/Plan


Problems:  


(1) Failure to thrive


(2) At high risk for aspiration


(3) Diabetes


(4) Protein-calorie malnutrition, severe


(5) Limited mobility in bed


(6) Flexion contractures


Assessment/Plan


aspiration precaution


symptomatic treatment


sliding scale


diabetic diet


check electrolytes


titrate fio2 to sat of 92%\





Calorie count reviewed nutritional needs are not met


Family refuses PEG


One-to-one feeder


consider comfort and palliative care





Subjective


ROS Limited/Unobtainable:  No


Constitutional:  Reports: no symptoms


HEENT:  Repors: no symptoms


Allergies:  


Coded Allergies:  


     LISINOPRIL (Verified  Allergy, Unknown, 9/14/19)





Objective





Last 24 Hour Vital Signs








  Date Time  Temp Pulse Resp B/P (MAP) Pulse Ox O2 Delivery O2 Flow Rate FiO2


 


9/17/19 09:00      Room Air  


 


9/17/19 08:09    152/85    


 


9/17/19 08:00 98.2 73 20 146/80 (102) 100   


 


9/17/19 04:00 98.2 70 19 152/85 (107) 100   


 


9/17/19 00:00 97.7 69 19 150/74 (99) 100   


 


9/16/19 21:00      Room Air  


 


9/16/19 20:00 98.1 72 16 113/62 (79) 100   


 


9/16/19 16:00 98.4 75 18 131/77 (95) 98   


 


9/16/19 12:00 98.3 70 18 127/80 (96) 98   

















Intake and Output  


 


 9/16/19 9/17/19





 19:00 07:00


 


Intake Total 470 ml 605 ml


 


Output Total 600 ml 700 ml


 


Balance -130 ml -95 ml


 


  


 


Intake Oral 420 ml 


 


IV Total 50 ml 605 ml


 


Output Urine Total 600 ml 700 ml


 


# Bowel Movements 3 1








General Appearance:  cachetic


HEENT:  normocephalic, atraumatic


Respiratory/Chest:  chest wall non-tender, normal breath sounds


Breasts:  no masses


Cardiovascular:  normal peripheral pulses, normal rate


Abdomen:  normal bowel sounds, soft, non tender, no scars


Extremities:  no clubbing


Skin:  no rash





Microbiology








 Date/Time


Source Procedure


Growth Status


 


 


 9/14/19 22:27


Blood Blood Culture - Preliminary


NO GROWTH AFTER 48 HOURS Resulted


 


 9/14/19 22:06


Blood Blood Culture - Preliminary


NO GROWTH AFTER 48 HOURS Resulted


 


 9/14/19 22:30


Nasal Nares MRSA Culture - Final


NO METHICILLIN RESISTANT STAPH AUREUS... Complete


 


 9/15/19 04:55


Stool Clostridium difficile Toxin Assay - Final Complete


 


 9/14/19 22:06


Urine,Clean Catch Urine Culture - Final


Escherichia Coli Complete


 


 9/14/19 22:30


Rectum - Final


NO CARBAPENEM-RESISTANT ENTEROBACTERI... Complete


 


 9/14/19 22:30


Rectum VRE Culture - Final


NO VANCOMYCIN RESISTANT ENTEROCOCCUS ... Complete








Laboratory Tests


9/17/19 05:40: 


White Blood Count 7.3, Red Blood Count 4.64, Hemoglobin 14.6, Hematocrit 43.2, 

Mean Corpuscular Volume 93, Mean Corpuscular Hemoglobin 31.4H, Mean Corpuscular 

Hemoglobin Concent 33.7, Red Cell Distribution Width 11.8, Platelet Count 177, 

Mean Platelet Volume 7.2, Neutrophils (%) (Auto) 72.4, Lymphocytes (%) (Auto) 

17.2L, Monocytes (%) (Auto) 9.6, Eosinophils (%) (Auto) 0.3, Basophils (%) (Auto

) 0.5, Sodium Level 140, Potassium Level 3.5, Chloride Level 103, Carbon 

Dioxide Level 29, Anion Gap 8, Blood Urea Nitrogen 9, Creatinine 0.5L, Estimat 

Glomerular Filtration Rate , Glucose Level 86, Calcium Level 9.0





Current Medications








 Medications


  (Trade)  Dose


 Ordered  Sig/Trent


 Route


 PRN Reason  Start Time


 Stop Time Status Last Admin


Dose Admin


 


 Acetaminophen


  (Tylenol)  650 mg  Q4H  PRN


 ORAL


 fever  9/15/19 08:30


 10/15/19 08:29   


 


 


 Albuterol/


 Ipratropium


  (Albuterol/


 Ipratropium)  3 ml  Q4H  PRN


 HHN


 sob  9/15/19 11:15


 9/20/19 11:14   


 


 


 Ceftriaxone


 Sodium 1 gm/


 Dextrose  55 ml @ 


 110 mls/hr  Q24H


 IVPB


   9/15/19 21:00


 9/22/19 20:59  9/16/19 20:37


 


 


 Clonidine HCl


  (Catapres Tab)  0.1 mg  Q6H  PRN


 ORAL


 sbp above 160  9/15/19 11:15


 10/15/19 11:14  9/15/19 21:35


 


 


 Dextrose


  (Dextrose 50%)  25 ml  Q30M  PRN


 IV


 Hypoglycemia  9/15/19 08:30


 10/15/19 08:29   


 


 


 Dextrose


  (Dextrose 50%)  50 ml  Q30M  PRN


 IV


 Hypoglycemia  9/15/19 08:30


 10/15/19 08:29   


 


 


 Dronabinol


  (Marinol)  2.5 mg  TID


 ORAL


   9/17/19 13:00


 10/17/19 12:59   


 


 


 Heparin Sodium


  (Porcine)


  (Heparin 5000


 units/ml)  5,000 units  EVERY 12  HOURS


 SUBQ


   9/15/19 09:00


 10/15/19 08:59  9/17/19 08:13


 


 


 Insulin Aspart


  (NovoLOG)    BEFORE MEALS AND  HS


 SUBQ


   9/15/19 11:30


 10/15/19 11:29  9/15/19 17:10


 


 


 Lorazepam


  (Ativan 2mg/ml


 1ml)  0.5 mg  Q4H  PRN


 IV


 For Anxiety  9/15/19 08:30


 9/22/19 08:29   


 


 


 Losartan Potassium


  (Cozaar)  25 mg  DAILY


 ORAL


   9/15/19 12:00


 10/15/19 11:59  9/17/19 08:09


 


 


 Morphine Sulfate


  (Morphine


 Sulfate)  1 mg  Q4H  PRN


 IVP


 For Pain  9/15/19 08:30


 9/22/19 08:29   


 


 


 Ondansetron HCl


  (Zofran)  4 mg  Q6H  PRN


 IVP


 Nausea & Vomiting  9/15/19 08:30


 10/15/19 08:29   


 


 


 Polyethylene


 Glycol


  (Miralax)  17 gm  HSPRN  PRN


 ORAL


 Constipation  9/15/19 08:30


 10/15/19 08:29   


 


 


 Sodium Chloride  1,000 ml @ 


 50 mls/hr  Q20H


 IV


   9/15/19 11:15


 10/15/19 11:14  9/17/19 03:31


 


 


 Vancomycin HCl


  (Firvanq)  125 mg  FOUR TIMES A  DAY


 ORAL


   9/17/19 13:00


 9/24/19 12:59 UNV  


 


 


 Zolpidem Tartrate


  (Ambien)  5 mg  HSPRN  PRN


 ORAL


 Insomnia  9/15/19 08:30


 9/22/19 08:29   


 

















Chau Fitzgerald MD Sep 17, 2019 11:42

## 2019-09-17 NOTE — GENERAL PROGRESS NOTE
Assessment/Plan


Problem List:  


(1) Diabetes


ICD Codes:  E11.9 - Type 2 diabetes mellitus without complications


SNOMED:  20035272


(2) Malnutrition


ICD Codes:  E46 - Unspecified protein-calorie malnutrition


SNOMED:  91972355


(3) Failure to thrive


SNOMED:  99146948


Qualifiers:  


   Qualified Codes:  R62.7 - Adult failure to thrive


(4) Weakness


ICD Codes:  R53.1 - Weakness


SNOMED:  48396014


(5) UTI (urinary tract infection)


ICD Codes:  N39.0 - Urinary tract infection, site not specified


SNOMED:  52377959


Qualifiers:  


   Qualified Codes:  N39.0 - Urinary tract infection, site not specified


Status:  stable, progressing


Assessment/Plan:


pt diet abx cbc bmp am aru eval





Subjective


Constitutional:  Reports: weakness


Allergies:  


Coded Allergies:  


     LISINOPRIL (Verified  Allergy, Unknown, 9/14/19)


All Systems:  reviewed and negative except above


Subjective


calm in bed





Objective





Last 24 Hour Vital Signs








  Date Time  Temp Pulse Resp B/P (MAP) Pulse Ox O2 Delivery O2 Flow Rate FiO2


 


9/17/19 12:00 98.4 81 19 138/76 (96) 99   


 


9/17/19 09:00      Room Air  


 


9/17/19 08:09    152/85    


 


9/17/19 08:00 98.2 73 20 146/80 (102) 100   


 


9/17/19 04:00 98.2 70 19 152/85 (107) 100   


 


9/17/19 00:00 97.7 69 19 150/74 (99) 100   


 


9/16/19 21:00      Room Air  


 


9/16/19 20:00 98.1 72 16 113/62 (79) 100   


 


9/16/19 16:00 98.4 75 18 131/77 (95) 98   

















Intake and Output  


 


 9/16/19 9/17/19





 19:00 07:00


 


Intake Total 470 ml 605 ml


 


Output Total 600 ml 700 ml


 


Balance -130 ml -95 ml


 


  


 


Intake Oral 420 ml 


 


IV Total 50 ml 605 ml


 


Output Urine Total 600 ml 700 ml


 


# Bowel Movements 3 1








Laboratory Tests


9/17/19 05:40: 


White Blood Count 7.3, Red Blood Count 4.64, Hemoglobin 14.6, Hematocrit 43.2, 

Mean Corpuscular Volume 93, Mean Corpuscular Hemoglobin 31.4H, Mean Corpuscular 

Hemoglobin Concent 33.7, Red Cell Distribution Width 11.8, Platelet Count 177, 

Mean Platelet Volume 7.2, Neutrophils (%) (Auto) 72.4, Lymphocytes (%) (Auto) 

17.2L, Monocytes (%) (Auto) 9.6, Eosinophils (%) (Auto) 0.3, Basophils (%) (Auto

) 0.5, Sodium Level 140, Potassium Level 3.5, Chloride Level 103, Carbon 

Dioxide Level 29, Anion Gap 8, Blood Urea Nitrogen 9, Creatinine 0.5L, Estimat 

Glomerular Filtration Rate , Glucose Level 86, Calcium Level 9.0


Height (Feet):  5


Height (Inches):  1.00


Weight (Pounds):  101


General Appearance:  lethargic


EENT:  normal ENT inspection


Neck:  normal alignment


Cardiovascular:  normal peripheral pulses, normal rate, regular rhythm


Respiratory/Chest:  chest wall non-tender, lungs clear, normal breath sounds


Abdomen:  normal bowel sounds, non tender, soft


Extremities:  normal inspection


Edema:  no edema noted Arm (L), no edema noted Arm (R), no edema noted Leg (L), 

no edema noted Leg (R), no edema noted Pedal (L), no edema noted Pedal (R), no 

edema noted Generalized


Neurologic:  motor weakness


Skin:  normal pigmentation, warm/dry











Chino Pineda DO Sep 17, 2019 13:20

## 2019-09-17 NOTE — NUR
P.T NOTE: LATE 09/16/19

P.T EVALUATION COMPLETED. PATIENT IS  NON VERBAL, NON ENGAGING WITH MOBILITY TASKS> 
PRESENTED LE HIP KNEE FLEXION CONTRACTURES. PATIENT IS DEPENDENT IN ALL AREAS OF BED 
MOBILITIES AND TRANSFER ACTIVITIES.BASED ON P.T EVALUATION, PATIENT IS CURRENTLY AT BASELINE 
FUNCTION AND NOT A CANDIDATE FOR SKILLED P.T SERVICES. RECOMMEND D/C TO SNF/LONG TERM CARE 
FACILITY.

## 2019-09-17 NOTE — NUR
NURSE NOTES:WOUND CARE NOTES:Pt presented on admission with small area of dry pink 
epithelial with surrounding hyperpigmentation  to sacrococcygeal area.Are of 
hyperpigmentation noted to L hip. Both heels are soft but blanchable . No other areas of 
skin concerns noted per body assessment.L lower ext contracted. Cavilon Skin Barrier applied 
to bony prominences. Moisture Barrier paste applied to buttocks. Sacral area covered with 
Optifoam drsg as prevention against friction. Both heels covered with Optifoam drsg for 
protection.



Recommendations: Apply Moisture Barrier paste to bilat groin, ischial areas and buttocks 
with each incontinence care.

                          Cover sacrum with Optifoam drsg. Change every 3 days and prn.

                          Apply Cavilon Skin Barrier to both heels. Cover each heel with 
Optifoam drsg. Change every 7 days and 

                          prn.

                          Reposition at least every 2hours or as tolerated.

                          Off-load heels with pillow.

## 2019-09-18 VITALS — DIASTOLIC BLOOD PRESSURE: 55 MMHG | SYSTOLIC BLOOD PRESSURE: 103 MMHG

## 2019-09-18 VITALS — SYSTOLIC BLOOD PRESSURE: 120 MMHG | DIASTOLIC BLOOD PRESSURE: 69 MMHG

## 2019-09-18 VITALS — SYSTOLIC BLOOD PRESSURE: 109 MMHG | DIASTOLIC BLOOD PRESSURE: 76 MMHG

## 2019-09-18 VITALS — SYSTOLIC BLOOD PRESSURE: 152 MMHG | DIASTOLIC BLOOD PRESSURE: 72 MMHG

## 2019-09-18 VITALS — SYSTOLIC BLOOD PRESSURE: 181 MMHG | DIASTOLIC BLOOD PRESSURE: 82 MMHG

## 2019-09-18 VITALS — SYSTOLIC BLOOD PRESSURE: 121 MMHG | DIASTOLIC BLOOD PRESSURE: 65 MMHG

## 2019-09-18 LAB
ADD MANUAL DIFF: NO
ANION GAP SERPL CALC-SCNC: 7 MMOL/L (ref 5–15)
BASOPHILS NFR BLD AUTO: 0.5 % (ref 0–2)
BUN SERPL-MCNC: 9 MG/DL (ref 7–18)
CALCIUM SERPL-MCNC: 9.1 MG/DL (ref 8.5–10.1)
CHLORIDE SERPL-SCNC: 104 MMOL/L (ref 98–107)
CO2 SERPL-SCNC: 29 MMOL/L (ref 21–32)
CREAT SERPL-MCNC: 0.5 MG/DL (ref 0.55–1.3)
EOSINOPHIL NFR BLD AUTO: 1.1 % (ref 0–3)
ERYTHROCYTE [DISTWIDTH] IN BLOOD BY AUTOMATED COUNT: 12.5 % (ref 11.6–14.8)
HCT VFR BLD CALC: 44.2 % (ref 37–47)
HGB BLD-MCNC: 14.7 G/DL (ref 12–16)
LYMPHOCYTES NFR BLD AUTO: 26.5 % (ref 20–45)
MCV RBC AUTO: 92 FL (ref 80–99)
MONOCYTES NFR BLD AUTO: 11.2 % (ref 1–10)
NEUTROPHILS NFR BLD AUTO: 60.8 % (ref 45–75)
PHOSPHATE SERPL-MCNC: 2.7 MG/DL (ref 2.5–4.9)
PLATELET # BLD: 188 K/UL (ref 150–450)
POTASSIUM SERPL-SCNC: 3.6 MMOL/L (ref 3.5–5.1)
RBC # BLD AUTO: 4.81 M/UL (ref 4.2–5.4)
SODIUM SERPL-SCNC: 140 MMOL/L (ref 136–145)
WBC # BLD AUTO: 5.9 K/UL (ref 4.8–10.8)

## 2019-09-18 RX ADMIN — HEPARIN SODIUM SCH UNITS: 5000 INJECTION INTRAVENOUS; SUBCUTANEOUS at 08:53

## 2019-09-18 RX ADMIN — INSULIN ASPART SCH UNITS: 100 INJECTION, SOLUTION INTRAVENOUS; SUBCUTANEOUS at 12:44

## 2019-09-18 RX ADMIN — LOSARTAN POTASSIUM SCH MG: 25 TABLET, FILM COATED ORAL at 08:49

## 2019-09-18 RX ADMIN — SODIUM CHLORIDE SCH MLS/HR: 0.9 INJECTION INTRAVENOUS at 21:25

## 2019-09-18 RX ADMIN — DRONABINOL SCH MG: 2.5 CAPSULE ORAL at 16:59

## 2019-09-18 RX ADMIN — INSULIN ASPART SCH UNITS: 100 INJECTION, SOLUTION INTRAVENOUS; SUBCUTANEOUS at 16:30

## 2019-09-18 RX ADMIN — VANCOMYCIN HYDROCHLORIDE SCH MG: 500 INJECTION, POWDER, LYOPHILIZED, FOR SOLUTION INTRAVENOUS at 10:53

## 2019-09-18 RX ADMIN — VANCOMYCIN HYDROCHLORIDE SCH MG: 500 INJECTION, POWDER, LYOPHILIZED, FOR SOLUTION INTRAVENOUS at 18:14

## 2019-09-18 RX ADMIN — DRONABINOL SCH MG: 2.5 CAPSULE ORAL at 08:49

## 2019-09-18 RX ADMIN — HEPARIN SODIUM SCH UNITS: 5000 INJECTION INTRAVENOUS; SUBCUTANEOUS at 21:26

## 2019-09-18 RX ADMIN — INSULIN ASPART SCH UNITS: 100 INJECTION, SOLUTION INTRAVENOUS; SUBCUTANEOUS at 21:00

## 2019-09-18 RX ADMIN — VANCOMYCIN HYDROCHLORIDE SCH MG: 500 INJECTION, POWDER, LYOPHILIZED, FOR SOLUTION INTRAVENOUS at 21:25

## 2019-09-18 RX ADMIN — VANCOMYCIN HYDROCHLORIDE SCH MG: 500 INJECTION, POWDER, LYOPHILIZED, FOR SOLUTION INTRAVENOUS at 13:50

## 2019-09-18 RX ADMIN — DRONABINOL SCH MG: 2.5 CAPSULE ORAL at 12:43

## 2019-09-18 RX ADMIN — INSULIN ASPART SCH UNITS: 100 INJECTION, SOLUTION INTRAVENOUS; SUBCUTANEOUS at 06:13

## 2019-09-18 NOTE — NUR
NURSE NOTES:

Received pt from [facility]. Pt is nonverbal. pt is in RA, no SOB or acute respiratory 
distress noted. pt has fogey cath in place is running well.pt has intact IV access RH 24g is 
running well. all needs attended, bed is locked and is in the lowest position, call light 
within easy reach. will continue to monitor.

## 2019-09-18 NOTE — PULMONOLOGY PROGRESS NOTE
Assessment/Plan


Problems:  


(1) Failure to thrive


(2) At high risk for aspiration


(3) Diabetes


(4) Protein-calorie malnutrition, severe


(5) Limited mobility in bed


(6) Flexion contractures


Assessment/Plan


aspiration precaution


symptomatic treatment


sliding scale


diabetic diet


check electrolytes


titrate fio2 to sat of 92%\





Calorie count reviewed nutritional needs are not met


Family refuses PEG


One-to-one feeder


 





dc planning





Subjective


ROS Limited/Unobtainable:  No


Constitutional:  Reports: no symptoms


HEENT:  Repors: no symptoms


Respiratory:  Reports: no symptoms


Allergies:  


Coded Allergies:  


     LISINOPRIL (Verified  Allergy, Unknown, 9/14/19)





Objective





Last 24 Hour Vital Signs








  Date Time  Temp Pulse Resp B/P (MAP) Pulse Ox O2 Delivery O2 Flow Rate FiO2


 


9/18/19 12:00 97.4 65 18 121/65 (83) 96   


 


9/18/19 09:00      Room Air  


 


9/18/19 08:49    152/72    


 


9/18/19 08:00 97.0 62 20 152/72 (98) 97   


 


9/18/19 04:00 98.2 60 18 109/76 (87) 96   


 


9/18/19 00:09 97.5 61 22 103/55 (71) 96   


 


9/17/19 21:00      Room Air  


 


9/17/19 20:00 97.5 89 22 104/80 (88) 97   


 


9/17/19 16:00 98.0 86 20 123/64 (83) 97   

















Intake and Output  


 


 9/17/19 9/18/19





 19:00 07:00


 


Intake Total 410 ml 555 ml


 


Output Total 450 ml 800 ml


 


Balance -40 ml -245 ml


 


  


 


Intake Oral 360 ml 


 


IV Total 50 ml 555 ml


 


Output Urine Total 450 ml 800 ml


 


# Bowel Movements 1 








General Appearance:  WD/WN


HEENT:  normocephalic, atraumatic, PERRL


Respiratory/Chest:  chest wall non-tender


Breasts:  no masses


Cardiovascular:  normal rate, no JVD


Abdomen:  soft, non tender, no scars


Extremities:  no cyanosis


Skin:  no rash


Laboratory Tests


9/18/19 05:50: 


White Blood Count 5.9, Red Blood Count 4.81, Hemoglobin 14.7, Hematocrit 44.2, 

Mean Corpuscular Volume 92, Mean Corpuscular Hemoglobin 30.5, Mean Corpuscular 

Hemoglobin Concent 33.2, Red Cell Distribution Width 12.5, Platelet Count 188, 

Mean Platelet Volume 7.4, Neutrophils (%) (Auto) 60.8, Lymphocytes (%) (Auto) 

26.5, Monocytes (%) (Auto) 11.2H, Eosinophils (%) (Auto) 1.1, Basophils (%) (

Auto) 0.5, Sodium Level 140, Potassium Level 3.6, Chloride Level 104, Carbon 

Dioxide Level 29, Anion Gap 7, Blood Urea Nitrogen 9, Creatinine 0.5L, Estimat 

Glomerular Filtration Rate , Glucose Level 78, Calcium Level 9.1, Phosphorus 

Level 2.7, Magnesium Level 1.6L





Current Medications








 Medications


  (Trade)  Dose


 Ordered  Sig/Trent


 Route


 PRN Reason  Start Time


 Stop Time Status Last Admin


Dose Admin


 


 Acetaminophen


  (Tylenol)  650 mg  Q4H  PRN


 ORAL


 fever  9/15/19 08:30


 10/15/19 08:29   


 


 


 Albuterol/


 Ipratropium


  (Albuterol/


 Ipratropium)  3 ml  Q4H  PRN


 HHN


 sob  9/15/19 11:15


 9/20/19 11:14   


 


 


 Ceftriaxone


 Sodium 1 gm/


 Dextrose  55 ml @ 


 110 mls/hr  Q24H


 IVPB


   9/15/19 21:00


 9/22/19 20:59  9/17/19 20:31


 


 


 Clonidine HCl


  (Catapres Tab)  0.1 mg  Q6H  PRN


 ORAL


 sbp above 160  9/15/19 11:15


 10/15/19 11:14  9/15/19 21:35


 


 


 Dextrose


  (Dextrose 50%)  25 ml  Q30M  PRN


 IV


 Hypoglycemia  9/15/19 08:30


 10/15/19 08:29   


 


 


 Dextrose


  (Dextrose 50%)  50 ml  Q30M  PRN


 IV


 Hypoglycemia  9/15/19 08:30


 10/15/19 08:29   


 


 


 Dronabinol


  (Marinol)  2.5 mg  TID


 ORAL


   9/17/19 13:00


 10/17/19 12:59  9/18/19 12:43


 


 


 Heparin Sodium


  (Porcine)


  (Heparin 5000


 units/ml)  5,000 units  EVERY 12  HOURS


 SUBQ


   9/15/19 09:00


 10/15/19 08:59  9/18/19 08:53


 


 


 Insulin Aspart


  (NovoLOG)    BEFORE MEALS AND  HS


 SUBQ


   9/15/19 11:30


 10/15/19 11:29  9/18/19 12:44


 


 


 Lorazepam


  (Ativan 2mg/ml


 1ml)  0.5 mg  Q4H  PRN


 IV


 For Anxiety  9/15/19 08:30


 9/22/19 08:29   


 


 


 Losartan Potassium


  (Cozaar)  25 mg  DAILY


 ORAL


   9/15/19 12:00


 10/15/19 11:59  9/18/19 08:49


 


 


 Morphine Sulfate


  (Morphine


 Sulfate)  1 mg  Q4H  PRN


 IVP


 For Pain  9/15/19 08:30


 9/22/19 08:29   


 


 


 Ondansetron HCl


  (Zofran)  4 mg  Q6H  PRN


 IVP


 Nausea & Vomiting  9/15/19 08:30


 10/15/19 08:29   


 


 


 Polyethylene


 Glycol


  (Miralax)  17 gm  HSPRN  PRN


 ORAL


 Constipation  9/15/19 08:30


 10/15/19 08:29   


 


 


 Sodium Chloride  1,000 ml @ 


 50 mls/hr  Q20H


 IV


   9/15/19 11:15


 10/15/19 11:14  9/17/19 23:31


 


 


 Vancomycin HCl


  (Firvanq)  125 mg  FOUR TIMES A  DAY


 ORAL


   9/17/19 13:00


 9/24/19 12:59  9/18/19 10:53


 


 


 Zolpidem Tartrate


  (Ambien)  5 mg  HSPRN  PRN


 ORAL


 Insomnia  9/15/19 08:30


 9/22/19 08:29   


 

















Chau Fitzgerald MD Sep 18, 2019 12:50

## 2019-09-18 NOTE — INFECTIOUS DISEASES PROG NOTE
Assessment/Plan


Assessment/Plan


83 yo female with PMHx of DM and failure  to thrive who was admitted to the 

hospital on 9/14/19. 





C. diff Diarrhea


   Toxin assay Pos 9/16/19


  


UTI


   UA (+)


   Urine Cx 9/14/19 - E,. coli (Sen to cefazolin)








DM


Failure to thrive





Plan





- Continue PO Vancomycin #2/10





- Continue Ceftriaxone #4/5





- Monitor BMs





- Monitor CBC and Temps





Thank you for this consult. We will continue to follow the patient with you.





Subjective


Allergies:  


Coded Allergies:  


     LISINOPRIL (Verified  Allergy, Unknown, 9/14/19)


Subjective


Afebrile


No leukocytosis


BM x 4 yesterday





Objective


Vital Signs





Last 24 Hour Vital Signs








  Date Time  Temp Pulse Resp B/P (MAP) Pulse Ox O2 Delivery O2 Flow Rate FiO2


 


9/18/19 08:49    152/72    


 


9/18/19 08:00 97.0 62 20 152/72 (98) 97   


 


9/18/19 04:00 98.2 60 18 109/76 (87) 96   


 


9/18/19 00:09 97.5 61 22 103/55 (71) 96   


 


9/17/19 21:00      Room Air  


 


9/17/19 20:00 97.5 89 22 104/80 (88) 97   


 


9/17/19 16:00 98.0 86 20 123/64 (83) 97   


 


9/17/19 12:00 98.4 81 19 138/76 (96) 99   








Height (Feet):  5


Height (Inches):  1.00


Weight (Pounds):  96


Objective


GEN: NAD


HEENT: NCAT, MMM, No scleral icterus


CHEST: CTAB, No W/C


HEART: RRR, S1, S2, 


Abd: Soft, NT, ND, +BS, No HSM





Laboratory Tests








Test


  9/18/19


05:50


 


White Blood Count


  5.9 K/UL


(4.8-10.8)


 


Red Blood Count


  4.81 M/UL


(4.20-5.40)


 


Hemoglobin


  14.7 G/DL


(12.0-16.0)


 


Hematocrit


  44.2 %


(37.0-47.0)


 


Mean Corpuscular Volume 92 FL (80-99)  


 


Mean Corpuscular Hemoglobin


  30.5 PG


(27.0-31.0)


 


Mean Corpuscular Hemoglobin


Concent 33.2 G/DL


(32.0-36.0)


 


Red Cell Distribution Width


  12.5 %


(11.6-14.8)


 


Platelet Count


  188 K/UL


(150-450)


 


Mean Platelet Volume


  7.4 FL


(6.5-10.1)


 


Neutrophils (%) (Auto)


  60.8 %


(45.0-75.0)


 


Lymphocytes (%) (Auto)


  26.5 %


(20.0-45.0)


 


Monocytes (%) (Auto)


  11.2 %


(1.0-10.0)  H


 


Eosinophils (%) (Auto)


  1.1 %


(0.0-3.0)


 


Basophils (%) (Auto)


  0.5 %


(0.0-2.0)


 


Sodium Level


  140 MMOL/L


(136-145)


 


Potassium Level


  3.6 MMOL/L


(3.5-5.1)


 


Chloride Level


  104 MMOL/L


()


 


Carbon Dioxide Level


  29 MMOL/L


(21-32)


 


Anion Gap


  7 mmol/L


(5-15)


 


Blood Urea Nitrogen


  9 mg/dL (7-18)


 


 


Creatinine


  0.5 MG/DL


(0.55-1.30)  L


 


Estimat Glomerular Filtration


Rate  mL/min (>60)  


 


 


Glucose Level


  78 MG/DL


()


 


Calcium Level


  9.1 MG/DL


(8.5-10.1)


 


Phosphorus Level


  2.7 MG/DL


(2.5-4.9)


 


Magnesium Level


  1.6 MG/DL


(1.8-2.4)  L











Current Medications








 Medications


  (Trade)  Dose


 Ordered  Sig/Trent


 Route


 PRN Reason  Start Time


 Stop Time Status Last Admin


Dose Admin


 


 Acetaminophen


  (Tylenol)  650 mg  Q4H  PRN


 ORAL


 fever  9/15/19 08:30


 10/15/19 08:29   


 


 


 Albuterol/


 Ipratropium


  (Albuterol/


 Ipratropium)  3 ml  Q4H  PRN


 HHN


 sob  9/15/19 11:15


 9/20/19 11:14   


 


 


 Ceftriaxone


 Sodium 1 gm/


 Dextrose  55 ml @ 


 110 mls/hr  Q24H


 IVPB


   9/15/19 21:00


 9/22/19 20:59  9/17/19 20:31


 


 


 Clonidine HCl


  (Catapres Tab)  0.1 mg  Q6H  PRN


 ORAL


 sbp above 160  9/15/19 11:15


 10/15/19 11:14  9/15/19 21:35


 


 


 Dextrose


  (Dextrose 50%)  25 ml  Q30M  PRN


 IV


 Hypoglycemia  9/15/19 08:30


 10/15/19 08:29   


 


 


 Dextrose


  (Dextrose 50%)  50 ml  Q30M  PRN


 IV


 Hypoglycemia  9/15/19 08:30


 10/15/19 08:29   


 


 


 Dronabinol


  (Marinol)  2.5 mg  TID


 ORAL


   9/17/19 13:00


 10/17/19 12:59  9/18/19 08:49


 


 


 Heparin Sodium


  (Porcine)


  (Heparin 5000


 units/ml)  5,000 units  EVERY 12  HOURS


 SUBQ


   9/15/19 09:00


 10/15/19 08:59  9/18/19 08:53


 


 


 Insulin Aspart


  (NovoLOG)    BEFORE MEALS AND  HS


 SUBQ


   9/15/19 11:30


 10/15/19 11:29  9/15/19 17:10


 


 


 Lorazepam


  (Ativan 2mg/ml


 1ml)  0.5 mg  Q4H  PRN


 IV


 For Anxiety  9/15/19 08:30


 9/22/19 08:29   


 


 


 Losartan Potassium


  (Cozaar)  25 mg  DAILY


 ORAL


   9/15/19 12:00


 10/15/19 11:59  9/18/19 08:49


 


 


 Morphine Sulfate


  (Morphine


 Sulfate)  1 mg  Q4H  PRN


 IVP


 For Pain  9/15/19 08:30


 9/22/19 08:29   


 


 


 Ondansetron HCl


  (Zofran)  4 mg  Q6H  PRN


 IVP


 Nausea & Vomiting  9/15/19 08:30


 10/15/19 08:29   


 


 


 Polyethylene


 Glycol


  (Miralax)  17 gm  HSPRN  PRN


 ORAL


 Constipation  9/15/19 08:30


 10/15/19 08:29   


 


 


 Sodium Chloride  1,000 ml @ 


 50 mls/hr  Q20H


 IV


   9/15/19 11:15


 10/15/19 11:14  9/17/19 23:31


 


 


 Vancomycin HCl


  (Firvanq)  125 mg  FOUR TIMES A  DAY


 ORAL


   9/17/19 13:00


 9/24/19 12:59  9/17/19 20:31


 


 


 Zolpidem Tartrate


  (Ambien)  5 mg  HSPRN  PRN


 ORAL


 Insomnia  9/15/19 08:30


 9/22/19 08:29   


 

















Truong Woods MD Sep 18, 2019 10:32

## 2019-09-18 NOTE — NUR
NURSE NOTES:

Received patient in no apparent distress. Non verbal. IV site patent and intact. [place] cath 
draining well by gravity. Bed in lowest position. Call light within reach. Will continue to 
monitor.

## 2019-09-18 NOTE — GI PROGRESS NOTE
Assessment/Plan


Problems:  


(1) Weakness


ICD Codes:  R53.1 - Weakness


SNOMED:  59243082


(2) Malnutrition


ICD Codes:  E46 - Unspecified protein-calorie malnutrition


SNOMED:  67903940


(3) Failure to thrive


SNOMED:  61823705


Qualifiers:  


   Qualified Codes:  R62.7 - Adult failure to thrive


Status:  stable, unchanged


Status Narrative


Discussed with Dr. Garg.


Assessment/Plan


Calorie count reviewed nutritional needs are not met


Family refuses PEG


Push p.o.


Strict aspiration precautions


One-to-one feeder


Add Marinol


Follow labs


dc planning





The patient was seen and examined at bedside and all new and available data was 

reviewed in the patients chart. I agree with the above findings, impression 

and plan.  (Patient seen earlier today. Signature stamp does not reflect 

patient encounter time.). - Shai Garg MD





Subjective


Subjective


Limited





Objective





Last 24 Hour Vital Signs








  Date Time  Temp Pulse Resp B/P (MAP) Pulse Ox O2 Delivery O2 Flow Rate FiO2


 


9/18/19 12:00 97.4 65 18 121/65 (83) 96   


 


9/18/19 09:00      Room Air  


 


9/18/19 08:49    152/72    


 


9/18/19 08:00 97.0 62 20 152/72 (98) 97   


 


9/18/19 04:00 98.2 60 18 109/76 (87) 96   


 


9/18/19 00:09 97.5 61 22 103/55 (71) 96   


 


9/17/19 21:00      Room Air  


 


9/17/19 20:00 97.5 89 22 104/80 (88) 97   


 


9/17/19 16:00 98.0 86 20 123/64 (83) 97   

















Intake and Output  


 


 9/17/19 9/18/19





 19:00 07:00


 


Intake Total 410 ml 555 ml


 


Output Total 450 ml 800 ml


 


Balance -40 ml -245 ml


 


  


 


Intake Oral 360 ml 


 


IV Total 50 ml 555 ml


 


Output Urine Total 450 ml 800 ml


 


# Bowel Movements 1 











Laboratory Tests








Test


  9/18/19


05:50


 


White Blood Count


  5.9 K/UL


(4.8-10.8)


 


Red Blood Count


  4.81 M/UL


(4.20-5.40)


 


Hemoglobin


  14.7 G/DL


(12.0-16.0)


 


Hematocrit


  44.2 %


(37.0-47.0)


 


Mean Corpuscular Volume 92 FL (80-99)  


 


Mean Corpuscular Hemoglobin


  30.5 PG


(27.0-31.0)


 


Mean Corpuscular Hemoglobin


Concent 33.2 G/DL


(32.0-36.0)


 


Red Cell Distribution Width


  12.5 %


(11.6-14.8)


 


Platelet Count


  188 K/UL


(150-450)


 


Mean Platelet Volume


  7.4 FL


(6.5-10.1)


 


Neutrophils (%) (Auto)


  60.8 %


(45.0-75.0)


 


Lymphocytes (%) (Auto)


  26.5 %


(20.0-45.0)


 


Monocytes (%) (Auto)


  11.2 %


(1.0-10.0)  H


 


Eosinophils (%) (Auto)


  1.1 %


(0.0-3.0)


 


Basophils (%) (Auto)


  0.5 %


(0.0-2.0)


 


Sodium Level


  140 MMOL/L


(136-145)


 


Potassium Level


  3.6 MMOL/L


(3.5-5.1)


 


Chloride Level


  104 MMOL/L


()


 


Carbon Dioxide Level


  29 MMOL/L


(21-32)


 


Anion Gap


  7 mmol/L


(5-15)


 


Blood Urea Nitrogen


  9 mg/dL (7-18)


 


 


Creatinine


  0.5 MG/DL


(0.55-1.30)  L


 


Estimat Glomerular Filtration


Rate  mL/min (>60)  


 


 


Glucose Level


  78 MG/DL


()


 


Calcium Level


  9.1 MG/DL


(8.5-10.1)


 


Phosphorus Level


  2.7 MG/DL


(2.5-4.9)


 


Magnesium Level


  1.6 MG/DL


(1.8-2.4)  L








Height (Feet):  5


Height (Inches):  1.00


Weight (Pounds):  96


General Appearance:  WD/WN, no apparent distress, alert


Cardiovascular:  normal rate


Respiratory/Chest:  normal breath sounds, no respiratory distress


Abdominal Exam:  normal bowel sounds, non tender, soft


Extremities:  normal range of motion, non-tender


Objective


Reported by the RN the patient tolerated approximately 25% of her meals











Malachi Mcqueen NP Sep 18, 2019 12:18

## 2019-09-18 NOTE — NUR
*-* DISCHARGE PLANNING *-*



PATIENT HAS BEEN REFERRED TO:



[facility]

P: [phone]

F: [phone]

## 2019-09-19 VITALS — SYSTOLIC BLOOD PRESSURE: 166 MMHG | DIASTOLIC BLOOD PRESSURE: 66 MMHG

## 2019-09-19 VITALS — DIASTOLIC BLOOD PRESSURE: 60 MMHG | SYSTOLIC BLOOD PRESSURE: 160 MMHG

## 2019-09-19 VITALS — DIASTOLIC BLOOD PRESSURE: 83 MMHG | SYSTOLIC BLOOD PRESSURE: 144 MMHG

## 2019-09-19 VITALS — SYSTOLIC BLOOD PRESSURE: 137 MMHG | DIASTOLIC BLOOD PRESSURE: 59 MMHG

## 2019-09-19 VITALS — SYSTOLIC BLOOD PRESSURE: 144 MMHG | DIASTOLIC BLOOD PRESSURE: 60 MMHG

## 2019-09-19 VITALS — DIASTOLIC BLOOD PRESSURE: 57 MMHG | SYSTOLIC BLOOD PRESSURE: 130 MMHG

## 2019-09-19 VITALS — SYSTOLIC BLOOD PRESSURE: 142 MMHG | DIASTOLIC BLOOD PRESSURE: 70 MMHG

## 2019-09-19 LAB
ADD MANUAL DIFF: NO
ANION GAP SERPL CALC-SCNC: 7 MMOL/L (ref 5–15)
BASOPHILS NFR BLD AUTO: 0.6 % (ref 0–2)
BUN SERPL-MCNC: 8 MG/DL (ref 7–18)
CALCIUM SERPL-MCNC: 9.1 MG/DL (ref 8.5–10.1)
CHLORIDE SERPL-SCNC: 104 MMOL/L (ref 98–107)
CO2 SERPL-SCNC: 29 MMOL/L (ref 21–32)
CREAT SERPL-MCNC: 0.5 MG/DL (ref 0.55–1.3)
EOSINOPHIL NFR BLD AUTO: 0.9 % (ref 0–3)
ERYTHROCYTE [DISTWIDTH] IN BLOOD BY AUTOMATED COUNT: 12.5 % (ref 11.6–14.8)
HCT VFR BLD CALC: 44.3 % (ref 37–47)
HGB BLD-MCNC: 14.7 G/DL (ref 12–16)
LYMPHOCYTES NFR BLD AUTO: 22 % (ref 20–45)
MCV RBC AUTO: 91 FL (ref 80–99)
MONOCYTES NFR BLD AUTO: 10.2 % (ref 1–10)
NEUTROPHILS NFR BLD AUTO: 66.3 % (ref 45–75)
PLATELET # BLD: 196 K/UL (ref 150–450)
POTASSIUM SERPL-SCNC: 3.6 MMOL/L (ref 3.5–5.1)
RBC # BLD AUTO: 4.85 M/UL (ref 4.2–5.4)
SODIUM SERPL-SCNC: 139 MMOL/L (ref 136–145)
WBC # BLD AUTO: 6.4 K/UL (ref 4.8–10.8)

## 2019-09-19 RX ADMIN — LOSARTAN POTASSIUM SCH MG: 25 TABLET, FILM COATED ORAL at 08:40

## 2019-09-19 RX ADMIN — DRONABINOL SCH MG: 2.5 CAPSULE ORAL at 08:33

## 2019-09-19 RX ADMIN — VANCOMYCIN HYDROCHLORIDE SCH MG: 500 INJECTION, POWDER, LYOPHILIZED, FOR SOLUTION INTRAVENOUS at 20:31

## 2019-09-19 RX ADMIN — INSULIN ASPART SCH UNITS: 100 INJECTION, SOLUTION INTRAVENOUS; SUBCUTANEOUS at 20:33

## 2019-09-19 RX ADMIN — DRONABINOL SCH MG: 2.5 CAPSULE ORAL at 17:12

## 2019-09-19 RX ADMIN — INSULIN ASPART SCH UNITS: 100 INJECTION, SOLUTION INTRAVENOUS; SUBCUTANEOUS at 06:30

## 2019-09-19 RX ADMIN — DRONABINOL SCH MG: 2.5 CAPSULE ORAL at 12:21

## 2019-09-19 RX ADMIN — HEPARIN SODIUM SCH UNITS: 5000 INJECTION INTRAVENOUS; SUBCUTANEOUS at 08:34

## 2019-09-19 RX ADMIN — VANCOMYCIN HYDROCHLORIDE SCH MG: 500 INJECTION, POWDER, LYOPHILIZED, FOR SOLUTION INTRAVENOUS at 08:33

## 2019-09-19 RX ADMIN — VANCOMYCIN HYDROCHLORIDE SCH MG: 500 INJECTION, POWDER, LYOPHILIZED, FOR SOLUTION INTRAVENOUS at 12:21

## 2019-09-19 RX ADMIN — HEPARIN SODIUM SCH UNITS: 5000 INJECTION INTRAVENOUS; SUBCUTANEOUS at 20:32

## 2019-09-19 RX ADMIN — INSULIN ASPART SCH UNITS: 100 INJECTION, SOLUTION INTRAVENOUS; SUBCUTANEOUS at 11:30

## 2019-09-19 RX ADMIN — VANCOMYCIN HYDROCHLORIDE SCH MG: 500 INJECTION, POWDER, LYOPHILIZED, FOR SOLUTION INTRAVENOUS at 17:12

## 2019-09-19 RX ADMIN — INSULIN ASPART SCH UNITS: 100 INJECTION, SOLUTION INTRAVENOUS; SUBCUTANEOUS at 16:30

## 2019-09-19 RX ADMIN — SODIUM CHLORIDE SCH MLS/HR: 0.9 INJECTION INTRAVENOUS at 20:31

## 2019-09-19 NOTE — NUR
NURSE NOTES:

Received pt from MARLIN DHILLON RN. Pt is nonverbal. pt is in RA, no SOB or acute respiratory 
distress noted. pt has fogey cath in place is running well.pt has intact IV access RH 24g is 
running well. all needs attended, bed is locked and is in the lowest position, call light 
within easy reach. will continue to monitor.

## 2019-09-19 NOTE — NUR
NURSE NOTES:

Patient in bed, awake, nonverbal. Unable to make needs known. BEd in low and locked 
position. Kept clean and comfortable. REspiration is even and unlabored. No s/s of pain or 
discomfort noted. Skin is warm and dry to touch. Abdomen is soft and non distended. IV site 
noted. Call light is at bedside. Will continue plan of care.

## 2019-09-19 NOTE — INFECTIOUS DISEASES PROG NOTE
Assessment/Plan


Assessment/Plan


85 yo female with PMHx of DM and failure  to thrive who was admitted to the 

hospital on 9/14/19. 





C. diff Diarrhea


   Toxin assay Pos 9/16/19


  


UTI


   UA (+)


   Urine Cx 9/14/19 - E,. coli (Sen to cefazolin)








DM


Failure to thrive





Plan





- Continue PO Vancomycin #3/10 ( End date 9/26/19)





- Continue Ceftriaxone #5/5


     Last day to day





- OK to D/C from an ID perspective 





- Monitor BMs





- Monitor CBC and Temps





Thank you for this consult. We will continue to follow the patient with you.





Subjective


Allergies:  


Coded Allergies:  


     LISINOPRIL (Verified  Allergy, Unknown, 9/14/19)


Subjective


Afebrile


No leukocytosis


BM x 1 yesterday





Objective


Vital Signs





Last 24 Hour Vital Signs








  Date Time  Temp Pulse Resp B/P (MAP) Pulse Ox O2 Delivery O2 Flow Rate FiO2


 


9/19/19 09:00      Room Air  


 


9/19/19 08:40    142/70    


 


9/19/19 08:00 98.2 80 20 142/70 (94) 99   


 


9/19/19 05:18    166/66    


 


9/19/19 04:00 97.9 52 20 166/66 (99) 100   


 


9/19/19 00:00 97.9 60 20 144/83 (103) 100   


 


9/18/19 21:25    181/82    


 


9/18/19 21:00      Room Air  


 


9/18/19 20:00 97.9 57 18 181/82 (115) 98   


 


9/18/19 15:55 97.2 68 20 120/69 (86) 97   








Height (Feet):  5


Height (Inches):  1.00


Weight (Pounds):  96


Objective


GEN: NAD, Satting well


HEENT: NCAT, MMM, No scleral icterus


CHEST: CTAB, No W/C


HEART: RRR, S1, S2, 


Abd: Soft, NT, ND, +BS, No HSM





Laboratory Tests








Test


  9/19/19


05:20


 


White Blood Count


  6.4 K/UL


(4.8-10.8)


 


Red Blood Count


  4.85 M/UL


(4.20-5.40)


 


Hemoglobin


  14.7 G/DL


(12.0-16.0)


 


Hematocrit


  44.3 %


(37.0-47.0)


 


Mean Corpuscular Volume 91 FL (80-99)  


 


Mean Corpuscular Hemoglobin


  30.3 PG


(27.0-31.0)


 


Mean Corpuscular Hemoglobin


Concent 33.1 G/DL


(32.0-36.0)


 


Red Cell Distribution Width


  12.5 %


(11.6-14.8)


 


Platelet Count


  196 K/UL


(150-450)


 


Mean Platelet Volume


  7.7 FL


(6.5-10.1)


 


Neutrophils (%) (Auto)


  66.3 %


(45.0-75.0)


 


Lymphocytes (%) (Auto)


  22.0 %


(20.0-45.0)


 


Monocytes (%) (Auto)


  10.2 %


(1.0-10.0)  H


 


Eosinophils (%) (Auto)


  0.9 %


(0.0-3.0)


 


Basophils (%) (Auto)


  0.6 %


(0.0-2.0)


 


Sodium Level


  139 MMOL/L


(136-145)


 


Potassium Level


  3.6 MMOL/L


(3.5-5.1)


 


Chloride Level


  104 MMOL/L


()


 


Carbon Dioxide Level


  29 MMOL/L


(21-32)


 


Anion Gap


  7 mmol/L


(5-15)


 


Blood Urea Nitrogen


  8 mg/dL (7-18)


 


 


Creatinine


  0.5 MG/DL


(0.55-1.30)  L


 


Estimat Glomerular Filtration


Rate  mL/min (>60)  


 


 


Glucose Level


  91 MG/DL


()


 


Calcium Level


  9.1 MG/DL


(8.5-10.1)











Current Medications








 Medications


  (Trade)  Dose


 Ordered  Sig/Trent


 Route


 PRN Reason  Start Time


 Stop Time Status Last Admin


Dose Admin


 


 Acetaminophen


  (Tylenol)  650 mg  Q4H  PRN


 ORAL


 fever  9/15/19 08:30


 10/15/19 08:29   


 


 


 Albuterol/


 Ipratropium


  (Albuterol/


 Ipratropium)  3 ml  Q4H  PRN


 HHN


 sob  9/15/19 11:15


 9/20/19 11:14   


 


 


 Ceftriaxone


 Sodium 1 gm/


 Dextrose  55 ml @ 


 110 mls/hr  Q24H


 IVPB


   9/15/19 21:00


 9/22/19 20:59  9/18/19 21:25


 


 


 Clonidine HCl


  (Catapres Tab)  0.1 mg  Q6H  PRN


 ORAL


 sbp above 160  9/15/19 11:15


 10/15/19 11:14  9/19/19 05:18


 


 


 Dextrose


  (Dextrose 50%)  25 ml  Q30M  PRN


 IV


 Hypoglycemia  9/15/19 08:30


 10/15/19 08:29   


 


 


 Dextrose


  (Dextrose 50%)  50 ml  Q30M  PRN


 IV


 Hypoglycemia  9/15/19 08:30


 10/15/19 08:29   


 


 


 Dronabinol


  (Marinol)  2.5 mg  TID


 ORAL


   9/17/19 13:00


 10/17/19 12:59  9/19/19 08:33


 


 


 Heparin Sodium


  (Porcine)


  (Heparin 5000


 units/ml)  5,000 units  EVERY 12  HOURS


 SUBQ


   9/15/19 09:00


 10/15/19 08:59  9/19/19 08:34


 


 


 Insulin Aspart


  (NovoLOG)    BEFORE MEALS AND  HS


 SUBQ


   9/15/19 11:30


 10/15/19 11:29  9/18/19 12:44


 


 


 Lorazepam


  (Ativan 2mg/ml


 1ml)  0.5 mg  Q4H  PRN


 IV


 For Anxiety  9/15/19 08:30


 9/22/19 08:29   


 


 


 Losartan Potassium


  (Cozaar)  25 mg  DAILY


 ORAL


   9/15/19 12:00


 10/15/19 11:59  9/19/19 08:40


 


 


 Morphine Sulfate


  (Morphine


 Sulfate)  1 mg  Q4H  PRN


 IVP


 For Pain  9/15/19 08:30


 9/22/19 08:29   


 


 


 Ondansetron HCl


  (Zofran)  4 mg  Q6H  PRN


 IVP


 Nausea & Vomiting  9/15/19 08:30


 10/15/19 08:29   


 


 


 Polyethylene


 Glycol


  (Miralax)  17 gm  HSPRN  PRN


 ORAL


 Constipation  9/15/19 08:30


 10/15/19 08:29   


 


 


 Sodium Chloride  1,000 ml @ 


 50 mls/hr  Q20H


 IV


   9/15/19 11:15


 10/15/19 11:14  9/18/19 18:15


 


 


 Vancomycin HCl


  (Firvanq)  125 mg  FOUR TIMES A  DAY


 ORAL


   9/17/19 13:00


 9/24/19 12:59  9/19/19 08:33


 


 


 Zolpidem Tartrate


  (Ambien)  5 mg  HSPRN  PRN


 ORAL


 Insomnia  9/15/19 08:30


 9/22/19 08:29   


 

















Truong Woods MD Sep 19, 2019 12:22

## 2019-09-19 NOTE — GENERAL PROGRESS NOTE
Assessment/Plan


Problem List:  


(1) Diabetes


ICD Codes:  E11.9 - Type 2 diabetes mellitus without complications


SNOMED:  73989588


(2) Malnutrition


ICD Codes:  E46 - Unspecified protein-calorie malnutrition


SNOMED:  24241841


(3) Failure to thrive


SNOMED:  79536978


Qualifiers:  


   Qualified Codes:  R62.7 - Adult failure to thrive


(4) Weakness


ICD Codes:  R53.1 - Weakness


SNOMED:  08831244


(5) UTI (urinary tract infection)


ICD Codes:  N39.0 - Urinary tract infection, site not specified


SNOMED:  48223323


Qualifiers:  


   Qualified Codes:  N39.0 - Urinary tract infection, site not specified


Status:  stable, progressing


Assessment/Plan:


pt diet abx dc to snf if clear





Subjective


Constitutional:  Reports: weakness


Allergies:  


Coded Allergies:  


     LISINOPRIL (Verified  Allergy, Unknown, 9/14/19)


All Systems:  reviewed and negative except above


Subjective


calm in bed





Objective





Last 24 Hour Vital Signs








  Date Time  Temp Pulse Resp B/P (MAP) Pulse Ox O2 Delivery O2 Flow Rate FiO2


 


9/19/19 09:00      Room Air  


 


9/19/19 08:40    142/70    


 


9/19/19 08:00 98.2 80 20 142/70 (94) 99   


 


9/19/19 05:18    166/66    


 


9/19/19 04:00 97.9 52 20 166/66 (99) 100   


 


9/19/19 00:00 97.9 60 20 144/83 (103) 100   


 


9/18/19 21:25    181/82    


 


9/18/19 21:00      Room Air  


 


9/18/19 20:00 97.9 57 18 181/82 (115) 98   


 


9/18/19 15:55 97.2 68 20 120/69 (86) 97   

















Intake and Output  


 


 9/18/19 9/19/19





 19:00 07:00


 


Intake Total 820 ml 705 ml


 


Output Total 150 ml 800 ml


 


Balance 670 ml -95 ml


 


  


 


Intake Oral 120 ml 


 


IV Total 700 ml 705 ml


 


Output Urine Total 150 ml 800 ml


 


# Voids  2








Laboratory Tests


9/19/19 05:20: 


White Blood Count 6.4, Red Blood Count 4.85, Hemoglobin 14.7, Hematocrit 44.3, 

Mean Corpuscular Volume 91, Mean Corpuscular Hemoglobin 30.3, Mean Corpuscular 

Hemoglobin Concent 33.1, Red Cell Distribution Width 12.5, Platelet Count 196, 

Mean Platelet Volume 7.7, Neutrophils (%) (Auto) 66.3, Lymphocytes (%) (Auto) 

22.0, Monocytes (%) (Auto) 10.2H, Eosinophils (%) (Auto) 0.9, Basophils (%) (

Auto) 0.6, Sodium Level 139, Potassium Level 3.6, Chloride Level 104, Carbon 

Dioxide Level 29, Anion Gap 7, Blood Urea Nitrogen 8, Creatinine 0.5L, Estimat 

Glomerular Filtration Rate , Glucose Level 91, Calcium Level 9.1


Height (Feet):  5


Height (Inches):  1.00


Weight (Pounds):  96


General Appearance:  lethargic


EENT:  normal ENT inspection


Neck:  normal alignment


Cardiovascular:  normal peripheral pulses, normal rate, regular rhythm


Respiratory/Chest:  chest wall non-tender, lungs clear, normal breath sounds


Abdomen:  normal bowel sounds, non tender, soft


Extremities:  normal inspection


Edema:  no edema noted Arm (L), no edema noted Arm (R), no edema noted Leg (L), 

no edema noted Leg (R), no edema noted Pedal (L), no edema noted Pedal (R), no 

edema noted Generalized


Neurologic:  responsive, motor weakness


Skin:  normal pigmentation, warm/dry











Chino Pineda DO Sep 19, 2019 12:04

## 2019-09-19 NOTE — GI PROGRESS NOTE
Assessment/Plan


Problems:  


(1) Weakness


ICD Codes:  R53.1 - Weakness


SNOMED:  35446612


(2) Malnutrition


ICD Codes:  E46 - Unspecified protein-calorie malnutrition


SNOMED:  35553193


(3) Failure to thrive


SNOMED:  15653781


Qualifiers:  


   Qualified Codes:  R62.7 - Adult failure to thrive


Status:  unchanged


Status Narrative


Discussed with Dr. Garg.


Assessment/Plan


Calorie count reviewed nutritional needs are not met


Family refuses PEG


Push p.o.


Strict aspiration precautions


One-to-one feeder


Add Marinol


Follow labs


dc planning





The patient was seen and examined at bedside and all new and available data was 

reviewed in the patients chart. I agree with the above findings, impression 

and plan.  (Patient seen earlier today. Signature stamp does not reflect 

patient encounter time.). - Shai Garg MD





Subjective


Subjective


Limited





Objective





Last 24 Hour Vital Signs








  Date Time  Temp Pulse Resp B/P (MAP) Pulse Ox O2 Delivery O2 Flow Rate FiO2


 


9/19/19 12:00 97.7 60 20 130/57 (81) 99   


 


9/19/19 09:00      Room Air  


 


9/19/19 08:40    142/70    


 


9/19/19 08:00 98.2 80 20 142/70 (94) 99   


 


9/19/19 05:18    166/66    


 


9/19/19 04:00 97.9 52 20 166/66 (99) 100   


 


9/19/19 00:00 97.9 60 20 144/83 (103) 100   


 


9/18/19 21:25    181/82    


 


9/18/19 21:00      Room Air  


 


9/18/19 20:00 97.9 57 18 181/82 (115) 98   


 


9/18/19 15:55 97.2 68 20 120/69 (86) 97   

















Intake and Output  


 


 9/18/19 9/19/19





 19:00 07:00


 


Intake Total 820 ml 705 ml


 


Output Total 150 ml 800 ml


 


Balance 670 ml -95 ml


 


  


 


Intake Oral 120 ml 


 


IV Total 700 ml 705 ml


 


Output Urine Total 150 ml 800 ml


 


# Voids  2











Laboratory Tests








Test


  9/19/19


05:20


 


White Blood Count


  6.4 K/UL


(4.8-10.8)


 


Red Blood Count


  4.85 M/UL


(4.20-5.40)


 


Hemoglobin


  14.7 G/DL


(12.0-16.0)


 


Hematocrit


  44.3 %


(37.0-47.0)


 


Mean Corpuscular Volume 91 FL (80-99)  


 


Mean Corpuscular Hemoglobin


  30.3 PG


(27.0-31.0)


 


Mean Corpuscular Hemoglobin


Concent 33.1 G/DL


(32.0-36.0)


 


Red Cell Distribution Width


  12.5 %


(11.6-14.8)


 


Platelet Count


  196 K/UL


(150-450)


 


Mean Platelet Volume


  7.7 FL


(6.5-10.1)


 


Neutrophils (%) (Auto)


  66.3 %


(45.0-75.0)


 


Lymphocytes (%) (Auto)


  22.0 %


(20.0-45.0)


 


Monocytes (%) (Auto)


  10.2 %


(1.0-10.0)  H


 


Eosinophils (%) (Auto)


  0.9 %


(0.0-3.0)


 


Basophils (%) (Auto)


  0.6 %


(0.0-2.0)


 


Sodium Level


  139 MMOL/L


(136-145)


 


Potassium Level


  3.6 MMOL/L


(3.5-5.1)


 


Chloride Level


  104 MMOL/L


()


 


Carbon Dioxide Level


  29 MMOL/L


(21-32)


 


Anion Gap


  7 mmol/L


(5-15)


 


Blood Urea Nitrogen


  8 mg/dL (7-18)


 


 


Creatinine


  0.5 MG/DL


(0.55-1.30)  L


 


Estimat Glomerular Filtration


Rate  mL/min (>60)  


 


 


Glucose Level


  91 MG/DL


()


 


Calcium Level


  9.1 MG/DL


(8.5-10.1)








Height (Feet):  5


Height (Inches):  1.00


Weight (Pounds):  96


General Appearance:  no apparent distress


Cardiovascular:  normal rate


Respiratory/Chest:  normal breath sounds, no respiratory distress


Abdominal Exam:  normal bowel sounds, non tender, soft


Extremities:  non-tender


Objective


Reported by the RN the patient tolerated approximately 25% of her meals











Malachi Mcqueen NP Sep 19, 2019 13:22

## 2019-09-19 NOTE — PULMONOLOGY PROGRESS NOTE
Assessment/Plan


Problems:  


(1) Failure to thrive


(2) At high risk for aspiration


(3) Diabetes


(4) Protein-calorie malnutrition, severe


(5) Limited mobility in bed


(6) Flexion contractures


Assessment/Plan


aspiration precaution


symptomatic treatment


sliding scale


diabetic diet


check electrolytes


titrate fio2 to sat of 92%\





Calorie count reviewed nutritional needs are not met


Family refuses PEG


One-to-one feeder


 





dc planning





Subjective


ROS Limited/Unobtainable:  No


Constitutional:  Reports: no symptoms


HEENT:  Repors: no symptoms


Allergies:  


Coded Allergies:  


     LISINOPRIL (Verified  Allergy, Unknown, 9/14/19)





Objective





Last 24 Hour Vital Signs








  Date Time  Temp Pulse Resp B/P (MAP) Pulse Ox O2 Delivery O2 Flow Rate FiO2


 


9/19/19 09:00      Room Air  


 


9/19/19 08:40    142/70    


 


9/19/19 08:00 98.2 80 20 142/70 (94) 99   


 


9/19/19 05:18    166/66    


 


9/19/19 04:00 97.9 52 20 166/66 (99) 100   


 


9/19/19 00:00 97.9 60 20 144/83 (103) 100   


 


9/18/19 21:25    181/82    


 


9/18/19 21:00      Room Air  


 


9/18/19 20:00 97.9 57 18 181/82 (115) 98   


 


9/18/19 15:55 97.2 68 20 120/69 (86) 97   

















Intake and Output  


 


 9/18/19 9/19/19





 19:00 07:00


 


Intake Total 820 ml 705 ml


 


Output Total 150 ml 800 ml


 


Balance 670 ml -95 ml


 


  


 


Intake Oral 120 ml 


 


IV Total 700 ml 705 ml


 


Output Urine Total 150 ml 800 ml


 


# Voids  2








General Appearance:  WD/WN


HEENT:  normocephalic, atraumatic


Respiratory/Chest:  chest wall non-tender, lungs clear


Breasts:  no masses


Cardiovascular:  normal peripheral pulses


Abdomen:  normal bowel sounds, soft, non tender


Genitourinary:  normal external genitalia


Skin:  no rash, no lesions


Laboratory Tests


9/19/19 05:20: 


White Blood Count 6.4, Red Blood Count 4.85, Hemoglobin 14.7, Hematocrit 44.3, 

Mean Corpuscular Volume 91, Mean Corpuscular Hemoglobin 30.3, Mean Corpuscular 

Hemoglobin Concent 33.1, Red Cell Distribution Width 12.5, Platelet Count 196, 

Mean Platelet Volume 7.7, Neutrophils (%) (Auto) 66.3, Lymphocytes (%) (Auto) 

22.0, Monocytes (%) (Auto) 10.2H, Eosinophils (%) (Auto) 0.9, Basophils (%) (

Auto) 0.6, Sodium Level 139, Potassium Level 3.6, Chloride Level 104, Carbon 

Dioxide Level 29, Anion Gap 7, Blood Urea Nitrogen 8, Creatinine 0.5L, Estimat 

Glomerular Filtration Rate , Glucose Level 91, Calcium Level 9.1





Current Medications








 Medications


  (Trade)  Dose


 Ordered  Sig/Trent


 Route


 PRN Reason  Start Time


 Stop Time Status Last Admin


Dose Admin


 


 Acetaminophen


  (Tylenol)  650 mg  Q4H  PRN


 ORAL


 fever  9/15/19 08:30


 10/15/19 08:29   


 


 


 Albuterol/


 Ipratropium


  (Albuterol/


 Ipratropium)  3 ml  Q4H  PRN


 HHN


 sob  9/15/19 11:15


 9/20/19 11:14   


 


 


 Ceftriaxone


 Sodium 1 gm/


 Dextrose  55 ml @ 


 110 mls/hr  Q24H


 IVPB


   9/15/19 21:00


 9/22/19 20:59  9/18/19 21:25


 


 


 Clonidine HCl


  (Catapres Tab)  0.1 mg  Q6H  PRN


 ORAL


 sbp above 160  9/15/19 11:15


 10/15/19 11:14  9/19/19 05:18


 


 


 Dextrose


  (Dextrose 50%)  25 ml  Q30M  PRN


 IV


 Hypoglycemia  9/15/19 08:30


 10/15/19 08:29   


 


 


 Dextrose


  (Dextrose 50%)  50 ml  Q30M  PRN


 IV


 Hypoglycemia  9/15/19 08:30


 10/15/19 08:29   


 


 


 Dronabinol


  (Marinol)  2.5 mg  TID


 ORAL


   9/17/19 13:00


 10/17/19 12:59  9/19/19 12:21


 


 


 Heparin Sodium


  (Porcine)


  (Heparin 5000


 units/ml)  5,000 units  EVERY 12  HOURS


 SUBQ


   9/15/19 09:00


 10/15/19 08:59  9/19/19 08:34


 


 


 Insulin Aspart


  (NovoLOG)    BEFORE MEALS AND  HS


 SUBQ


   9/15/19 11:30


 10/15/19 11:29  9/18/19 12:44


 


 


 Lorazepam


  (Ativan 2mg/ml


 1ml)  0.5 mg  Q4H  PRN


 IV


 For Anxiety  9/15/19 08:30


 9/22/19 08:29   


 


 


 Losartan Potassium


  (Cozaar)  25 mg  DAILY


 ORAL


   9/15/19 12:00


 10/15/19 11:59  9/19/19 08:40


 


 


 Morphine Sulfate


  (Morphine


 Sulfate)  1 mg  Q4H  PRN


 IVP


 For Pain  9/15/19 08:30


 9/22/19 08:29   


 


 


 Ondansetron HCl


  (Zofran)  4 mg  Q6H  PRN


 IVP


 Nausea & Vomiting  9/15/19 08:30


 10/15/19 08:29   


 


 


 Polyethylene


 Glycol


  (Miralax)  17 gm  HSPRN  PRN


 ORAL


 Constipation  9/15/19 08:30


 10/15/19 08:29   


 


 


 Sodium Chloride  1,000 ml @ 


 50 mls/hr  Q20H


 IV


   9/15/19 11:15


 10/15/19 11:14  9/18/19 18:15


 


 


 Vancomycin HCl


  (Firvanq)  125 mg  FOUR TIMES A  DAY


 ORAL


   9/17/19 13:00


 9/24/19 12:59  9/19/19 12:21


 


 


 Zolpidem Tartrate


  (Ambien)  5 mg  HSPRN  PRN


 ORAL


 Insomnia  9/15/19 08:30


 9/22/19 08:29   


 

















Chau Fitzgerald MD Sep 19, 2019 12:27

## 2019-09-20 VITALS — DIASTOLIC BLOOD PRESSURE: 77 MMHG | SYSTOLIC BLOOD PRESSURE: 140 MMHG

## 2019-09-20 VITALS — SYSTOLIC BLOOD PRESSURE: 151 MMHG | DIASTOLIC BLOOD PRESSURE: 63 MMHG

## 2019-09-20 VITALS — DIASTOLIC BLOOD PRESSURE: 85 MMHG | SYSTOLIC BLOOD PRESSURE: 113 MMHG

## 2019-09-20 VITALS — SYSTOLIC BLOOD PRESSURE: 113 MMHG | DIASTOLIC BLOOD PRESSURE: 60 MMHG

## 2019-09-20 VITALS — SYSTOLIC BLOOD PRESSURE: 145 MMHG | DIASTOLIC BLOOD PRESSURE: 55 MMHG

## 2019-09-20 LAB
ADD MANUAL DIFF: NO
ANION GAP SERPL CALC-SCNC: 12 MMOL/L (ref 5–15)
BASOPHILS NFR BLD AUTO: 0.7 % (ref 0–2)
BUN SERPL-MCNC: 17 MG/DL (ref 7–18)
CALCIUM SERPL-MCNC: 8.8 MG/DL (ref 8.5–10.1)
CHLORIDE SERPL-SCNC: 100 MMOL/L (ref 98–107)
CO2 SERPL-SCNC: 24 MMOL/L (ref 21–32)
CREAT SERPL-MCNC: 0.7 MG/DL (ref 0.55–1.3)
EOSINOPHIL NFR BLD AUTO: 0 % (ref 0–3)
ERYTHROCYTE [DISTWIDTH] IN BLOOD BY AUTOMATED COUNT: 12.5 % (ref 11.6–14.8)
HCT VFR BLD CALC: 40.1 % (ref 37–47)
HGB BLD-MCNC: 13.3 G/DL (ref 12–16)
LYMPHOCYTES NFR BLD AUTO: 11.6 % (ref 20–45)
MCV RBC AUTO: 91 FL (ref 80–99)
MONOCYTES NFR BLD AUTO: 5 % (ref 1–10)
NEUTROPHILS NFR BLD AUTO: 82.7 % (ref 45–75)
PLATELET # BLD: 192 K/UL (ref 150–450)
POTASSIUM SERPL-SCNC: 3.5 MMOL/L (ref 3.5–5.1)
RBC # BLD AUTO: 4.4 M/UL (ref 4.2–5.4)
SODIUM SERPL-SCNC: 136 MMOL/L (ref 136–145)
WBC # BLD AUTO: 8.7 K/UL (ref 4.8–10.8)

## 2019-09-20 RX ADMIN — VANCOMYCIN HYDROCHLORIDE SCH MG: 500 INJECTION, POWDER, LYOPHILIZED, FOR SOLUTION INTRAVENOUS at 12:26

## 2019-09-20 RX ADMIN — HEPARIN SODIUM SCH UNITS: 5000 INJECTION INTRAVENOUS; SUBCUTANEOUS at 08:40

## 2019-09-20 RX ADMIN — DRONABINOL SCH MG: 2.5 CAPSULE ORAL at 17:56

## 2019-09-20 RX ADMIN — LOSARTAN POTASSIUM SCH MG: 25 TABLET, FILM COATED ORAL at 08:38

## 2019-09-20 RX ADMIN — HEPARIN SODIUM SCH UNITS: 5000 INJECTION INTRAVENOUS; SUBCUTANEOUS at 20:50

## 2019-09-20 RX ADMIN — INSULIN ASPART SCH UNITS: 100 INJECTION, SOLUTION INTRAVENOUS; SUBCUTANEOUS at 06:13

## 2019-09-20 RX ADMIN — VANCOMYCIN HYDROCHLORIDE SCH MG: 500 INJECTION, POWDER, LYOPHILIZED, FOR SOLUTION INTRAVENOUS at 17:56

## 2019-09-20 RX ADMIN — DRONABINOL SCH MG: 2.5 CAPSULE ORAL at 12:26

## 2019-09-20 RX ADMIN — INSULIN ASPART SCH UNITS: 100 INJECTION, SOLUTION INTRAVENOUS; SUBCUTANEOUS at 11:30

## 2019-09-20 RX ADMIN — VANCOMYCIN HYDROCHLORIDE SCH MG: 500 INJECTION, POWDER, LYOPHILIZED, FOR SOLUTION INTRAVENOUS at 08:38

## 2019-09-20 RX ADMIN — INSULIN ASPART SCH UNITS: 100 INJECTION, SOLUTION INTRAVENOUS; SUBCUTANEOUS at 20:50

## 2019-09-20 RX ADMIN — INSULIN ASPART SCH UNITS: 100 INJECTION, SOLUTION INTRAVENOUS; SUBCUTANEOUS at 16:30

## 2019-09-20 RX ADMIN — VANCOMYCIN HYDROCHLORIDE SCH MG: 500 INJECTION, POWDER, LYOPHILIZED, FOR SOLUTION INTRAVENOUS at 20:48

## 2019-09-20 RX ADMIN — DRONABINOL SCH MG: 2.5 CAPSULE ORAL at 08:38

## 2019-09-20 NOTE — NUR
SI:   FAILURE TO THRIVE c-diff

T. 98.3 HR 63 RR 20 B/P 145/85







IS:     VANCO PO

CEFTRIAXONE IV

*********MED/SURG STATUS*****

## 2019-09-20 NOTE — NUR
RD ASSESSMENT & RECOMMENDATIONS

SEE CARE ACTIVITY FOR COMPLETE ASSESSMENT



DAILY ESTIMATED NEEDS:

Needs based on FTT, 46kg  

30-35  kcals/kg 

7681-4971  total kcals

1-1.5  g protein/kg

46-69  g total protein 

25-30  mL/kg

7490-4739  total fluid mLs



NUTRITION DIAGNOSIS:

Increased kcal and pro needs r/t wound healing as evidenced by pt w/

coccyx pressure ulcer, w/ generalized moderate to severe wasting.



CURRENT DIET: Regular liquify puree w/ NTL     





PO DIET RECOMMENDATIONS:

Liberalized Regular diet / texture per SLP  



ENTERAL NUTRITION RECOMMENDATIONS:

Osmolite 1.2 @48ml/hr x24 hrs   to provide 1152ml, 1382 kcal, 64g pro, 945ml free H2O  



- As medically indicated, obtain GI access, start Osmolite 1.2 @18ml/hr for 6 hrs.

- Advance as tolerated 10ml/hr q4-6 hrs to goal rate.

- Flush per MD, HOB over 30 degrees

- Monitor BG and need for carb control TF / NISS







ADDITIONAL RECOMMENDATIONS:

1) Obtain calibrated bed scale wts 

2) F/up w/ kcal count- incomplete  

3) Add Ensure Enlive TID w/ meals  

4) SLP for appropriate texture  

5) Wound care: add Melvin BID + Vit C 250mg daily to maintain skin integrity

6) C-diff +, check lytes daily

## 2019-09-20 NOTE — NUR
NURSE NOTES:

Pt in bed with head of bed elevated, daughter at bedside and now reconsidering G tube 
placement and wants to speak to the Dr, IV fluids running, perkins catheter in place, will 
continue to monitor.

## 2019-09-20 NOTE — NUR
NURSE NOTES:

blood sugar was 119@1630. RN held insulin 1unit d/t patient has not eaten. the daughter at 
the bedside was agreed with it. RN will continue to monitor patient's condition.

## 2019-09-20 NOTE — GENERAL PROGRESS NOTE
Assessment/Plan


Problem List:  


(1) Diabetes


ICD Codes:  E11.9 - Type 2 diabetes mellitus without complications


SNOMED:  10962054


(2) Malnutrition


ICD Codes:  E46 - Unspecified protein-calorie malnutrition


SNOMED:  25353213


(3) Failure to thrive


SNOMED:  43238967


Qualifiers:  


   Qualified Codes:  R62.7 - Adult failure to thrive


(4) Weakness


ICD Codes:  R53.1 - Weakness


SNOMED:  09367618


(5) UTI (urinary tract infection)


ICD Codes:  N39.0 - Urinary tract infection, site not specified


SNOMED:  05030442


Qualifiers:  


   Qualified Codes:  N39.0 - Urinary tract infection, site not specified


Status:  unchanged


Assessment/Plan:


pt diet abx neuro eval merman  cbc bmp am





Subjective


Constitutional:  Reports: weakness


Allergies:  


Coded Allergies:  


     LISINOPRIL (Verified  Allergy, Unknown, 9/14/19)


All Systems:  reviewed and negative except above


Subjective


calm in bed





Objective





Last 24 Hour Vital Signs








  Date Time  Temp Pulse Resp B/P (MAP) Pulse Ox O2 Delivery O2 Flow Rate FiO2


 


9/20/19 12:00 98.3 63 20 145/55 (85) 99   


 


9/20/19 09:00      Room Air  


 


9/20/19 08:38    113/60    


 


9/20/19 08:00 98.3 69 20 113/60 (77) 99   


 


9/20/19 04:00 99.0 73 20 140/77 (98) 96   


 


9/19/19 23:56 98.5 68 18 144/60 (88) 96   


 


9/19/19 20:50      Room Air  


 


9/19/19 20:00 99.4 67 20 160/60 (93) 97   


 


9/19/19 16:00 99.3 64 20 137/59 (85) 99   

















Intake and Output  


 


 9/19/19 9/20/19





 19:00 07:00


 


Intake Total 600 ml 605 ml


 


Output Total 250 ml 150 ml


 


Balance 350 ml 455 ml


 


  


 


IV Total 600 ml 605 ml


 


Output Urine Total 250 ml 150 ml


 


# Voids 2 








Laboratory Tests


9/20/19 04:57: 


White Blood Count 8.7, Red Blood Count 4.40, Hemoglobin 13.3, Hematocrit 40.1, 

Mean Corpuscular Volume 91, Mean Corpuscular Hemoglobin 30.2, Mean Corpuscular 

Hemoglobin Concent 33.2, Red Cell Distribution Width 12.5, Platelet Count 192, 

Mean Platelet Volume 6.6, Neutrophils (%) (Auto) 82.7H, Lymphocytes (%) (Auto) 

11.6L, Monocytes (%) (Auto) 5.0, Eosinophils (%) (Auto) 0.0, Basophils (%) (Auto

) 0.7, Sodium Level 136, Potassium Level 3.5, Chloride Level 100, Carbon 

Dioxide Level 24, Anion Gap 12, Blood Urea Nitrogen 17, Creatinine 0.7, Estimat 

Glomerular Filtration Rate , Glucose Level 90, Calcium Level 8.8


Height (Feet):  5


Height (Inches):  1.00


Weight (Pounds):  96


General Appearance:  lethargic


EENT:  normal ENT inspection


Neck:  normal alignment


Cardiovascular:  normal peripheral pulses, normal rate, regular rhythm


Respiratory/Chest:  chest wall non-tender, lungs clear, normal breath sounds


Abdomen:  normal bowel sounds, non tender, soft


Extremities:  normal inspection


Edema:  no edema noted Arm (L), no edema noted Arm (R), no edema noted Leg (L), 

no edema noted Leg (R), no edema noted Pedal (L), no edema noted Pedal (R), no 

edema noted Generalized


Neurologic:  motor weakness


Skin:  normal pigmentation, warm/dry











Chino Pineda DO Sep 20, 2019 14:58

## 2019-09-20 NOTE — NUR
NURSE NOTES:

patient's daughter concerned the patient does not talk since admitted hospital, noticed 
patient was drooling when she was lying down on her side.  the daughter talked to charge 
nurse/JESUS Troy. charge nurse notified Edwin Bains and MD is going to look for a neuro consult 
for patient.

## 2019-09-20 NOTE — NUR
NURSE NOTES:

received report from JESUS Arana. patient in bed. sleeping. no respiratory distress noted. no 
facial grimacing. IV on RH running fluid. f/c draining. contact isolation. PPE at all times. 
bed in the lowest position and locked. call light within reach. alarm on. will continue to 
provide plan of care.

## 2019-09-20 NOTE — PULMONOLOGY PROGRESS NOTE
Assessment/Plan


Problems:  


(1) Failure to thrive


(2) At high risk for aspiration


(3) Diabetes


(4) Protein-calorie malnutrition, severe


(5) Limited mobility in bed


(6) Flexion contractures


Assessment/Plan


aspiration precaution


symptomatic treatment


sliding scale


diabetic diet


check electrolytes


titrate fio2 to sat of 92%\





Calorie count reviewed nutritional needs are not met


Family refuses PEG


One-to-one feeder


 


discussed with pts daughter. she is out of town


dc planning





Subjective


ROS Limited/Unobtainable:  No


Constitutional:  Reports: no symptoms


Allergies:  


Coded Allergies:  


     LISINOPRIL (Verified  Allergy, Unknown, 9/14/19)





Objective





Last 24 Hour Vital Signs








  Date Time  Temp Pulse Resp B/P (MAP) Pulse Ox O2 Delivery O2 Flow Rate FiO2


 


9/20/19 12:00 98.3 63 20 145/55 (85) 99   


 


9/20/19 09:00      Room Air  


 


9/20/19 08:38    113/60    


 


9/20/19 08:00 98.3 69 20 113/60 (77) 99   


 


9/20/19 04:00 99.0 73 20 140/77 (98) 96   


 


9/19/19 23:56 98.5 68 18 144/60 (88) 96   


 


9/19/19 20:50      Room Air  


 


9/19/19 20:00 99.4 67 20 160/60 (93) 97   


 


9/19/19 16:00 99.3 64 20 137/59 (85) 99   

















Intake and Output  


 


 9/19/19 9/20/19





 19:00 07:00


 


Intake Total 600 ml 605 ml


 


Output Total 250 ml 150 ml


 


Balance 350 ml 455 ml


 


  


 


IV Total 600 ml 605 ml


 


Output Urine Total 250 ml 150 ml


 


# Voids 2 








General Appearance:  cachetic


HEENT:  normocephalic, atraumatic


Respiratory/Chest:  chest wall non-tender, lungs clear


Breasts:  no masses


Cardiovascular:  normal peripheral pulses, normal rate


Abdomen:  soft, non tender


Extremities:  no clubbing


Laboratory Tests


9/20/19 04:57: 


White Blood Count 8.7, Red Blood Count 4.40, Hemoglobin 13.3, Hematocrit 40.1, 

Mean Corpuscular Volume 91, Mean Corpuscular Hemoglobin 30.2, Mean Corpuscular 

Hemoglobin Concent 33.2, Red Cell Distribution Width 12.5, Platelet Count 192, 

Mean Platelet Volume 6.6, Neutrophils (%) (Auto) 82.7H, Lymphocytes (%) (Auto) 

11.6L, Monocytes (%) (Auto) 5.0, Eosinophils (%) (Auto) 0.0, Basophils (%) (Auto

) 0.7, Sodium Level 136, Potassium Level 3.5, Chloride Level 100, Carbon 

Dioxide Level 24, Anion Gap 12, Blood Urea Nitrogen 17, Creatinine 0.7, Estimat 

Glomerular Filtration Rate , Glucose Level 90, Calcium Level 8.8





Current Medications








 Medications


  (Trade)  Dose


 Ordered  Sig/Trent


 Route


 PRN Reason  Start Time


 Stop Time Status Last Admin


Dose Admin


 


 Acetaminophen


  (Tylenol)  650 mg  Q4H  PRN


 ORAL


 fever  9/15/19 08:30


 10/15/19 08:29   


 


 


 Clonidine HCl


  (Catapres Tab)  0.1 mg  Q6H  PRN


 ORAL


 sbp above 160  9/15/19 11:15


 10/15/19 11:14  9/19/19 05:18


 


 


 Dextrose


  (Dextrose 50%)  25 ml  Q30M  PRN


 IV


 Hypoglycemia  9/15/19 08:30


 10/15/19 08:29   


 


 


 Dextrose


  (Dextrose 50%)  50 ml  Q30M  PRN


 IV


 Hypoglycemia  9/15/19 08:30


 10/15/19 08:29   


 


 


 Dronabinol


  (Marinol)  2.5 mg  TID


 ORAL


   9/17/19 13:00


 10/17/19 12:59  9/20/19 12:26


 


 


 Heparin Sodium


  (Porcine)


  (Heparin 5000


 units/ml)  5,000 units  EVERY 12  HOURS


 SUBQ


   9/15/19 09:00


 10/15/19 08:59  9/20/19 08:40


 


 


 Insulin Aspart


  (NovoLOG)    BEFORE MEALS AND  HS


 SUBQ


   9/15/19 11:30


 10/15/19 11:29  9/19/19 20:33


 


 


 Lorazepam


  (Ativan 2mg/ml


 1ml)  0.5 mg  Q4H  PRN


 IV


 For Anxiety  9/15/19 08:30


 9/22/19 08:29   


 


 


 Losartan Potassium


  (Cozaar)  25 mg  DAILY


 ORAL


   9/15/19 12:00


 10/15/19 11:59  9/20/19 08:38


 


 


 Morphine Sulfate


  (Morphine


 Sulfate)  1 mg  Q4H  PRN


 IVP


 For Pain  9/15/19 08:30


 9/22/19 08:29   


 


 


 Ondansetron HCl


  (Zofran)  4 mg  Q6H  PRN


 IVP


 Nausea & Vomiting  9/15/19 08:30


 10/15/19 08:29   


 


 


 Polyethylene


 Glycol


  (Miralax)  17 gm  HSPRN  PRN


 ORAL


 Constipation  9/15/19 08:30


 10/15/19 08:29   


 


 


 Sodium Chloride  1,000 ml @ 


 50 mls/hr  Q20H


 IV


   9/15/19 11:15


 10/15/19 11:14  9/20/19 11:20


 


 


 Vancomycin HCl


  (Firvanq)  125 mg  FOUR TIMES A  DAY


 ORAL


   9/17/19 13:00


 9/24/19 12:59  9/20/19 12:26


 


 


 Zolpidem Tartrate


  (Ambien)  5 mg  HSPRN  PRN


 ORAL


 Insomnia  9/15/19 08:30


 9/22/19 08:29   


 

















Chau Fitzgerald MD Sep 20, 2019 14:58

## 2019-09-20 NOTE — GI PROGRESS NOTE
Assessment/Plan


Problems:  


(1) Weakness


ICD Codes:  R53.1 - Weakness


SNOMED:  42310267


(2) Malnutrition


ICD Codes:  E46 - Unspecified protein-calorie malnutrition


SNOMED:  14050671


(3) Failure to thrive


SNOMED:  61852154


Qualifiers:  


   Qualified Codes:  R62.7 - Adult failure to thrive


Status:  unchanged


Status Narrative


Discussed with Dr. Garg.


Assessment/Plan


Calorie count reviewed nutritional needs are not met


Family refuses PEG


Push p.o.


Strict aspiration precautions


One-to-one feeder


Add Marinol


Follow labs


dc planning





The patient was seen and examined at bedside and all new and available data was 

reviewed in the patients chart. I agree with the above findings, impression 

and plan.  (Patient seen earlier today. Signature stamp does not reflect 

patient encounter time.). - Shai Garg MD





Subjective


Subjective


Limited





Objective





Last 24 Hour Vital Signs








  Date Time  Temp Pulse Resp B/P (MAP) Pulse Ox O2 Delivery O2 Flow Rate FiO2


 


9/20/19 08:38    113/60    


 


9/20/19 08:00 98.3 69 20 113/60 (77) 99   


 


9/20/19 04:00 99.0 73 20 140/77 (98) 96   


 


9/19/19 23:56 98.5 68 18 144/60 (88) 96   


 


9/19/19 20:50      Room Air  


 


9/19/19 20:00 99.4 67 20 160/60 (93) 97   


 


9/19/19 16:00 99.3 64 20 137/59 (85) 99   


 


9/19/19 12:00 97.7 60 20 130/57 (81) 99   

















Intake and Output  


 


 9/19/19 9/20/19





 19:00 07:00


 


Intake Total 600 ml 555 ml


 


Output Total 250 ml 150 ml


 


Balance 350 ml 405 ml


 


  


 


IV Total 600 ml 555 ml


 


Output Urine Total 250 ml 150 ml


 


# Voids 2 











Laboratory Tests








Test


  9/20/19


04:57


 


White Blood Count


  8.7 K/UL


(4.8-10.8)


 


Red Blood Count


  4.40 M/UL


(4.20-5.40)


 


Hemoglobin


  13.3 G/DL


(12.0-16.0)


 


Hematocrit


  40.1 %


(37.0-47.0)


 


Mean Corpuscular Volume 91 FL (80-99)  


 


Mean Corpuscular Hemoglobin


  30.2 PG


(27.0-31.0)


 


Mean Corpuscular Hemoglobin


Concent 33.2 G/DL


(32.0-36.0)


 


Red Cell Distribution Width


  12.5 %


(11.6-14.8)


 


Platelet Count


  192 K/UL


(150-450)


 


Mean Platelet Volume


  6.6 FL


(6.5-10.1)


 


Neutrophils (%) (Auto)


  82.7 %


(45.0-75.0)  H


 


Lymphocytes (%) (Auto)


  11.6 %


(20.0-45.0)  L


 


Monocytes (%) (Auto)


  5.0 %


(1.0-10.0)


 


Eosinophils (%) (Auto)


  0.0 %


(0.0-3.0)


 


Basophils (%) (Auto)


  0.7 %


(0.0-2.0)


 


Sodium Level


  136 MMOL/L


(136-145)


 


Potassium Level


  3.5 MMOL/L


(3.5-5.1)


 


Chloride Level


  100 MMOL/L


()


 


Carbon Dioxide Level


  24 MMOL/L


(21-32)


 


Anion Gap


  12 mmol/L


(5-15)


 


Blood Urea Nitrogen


  17 mg/dL


(7-18)


 


Creatinine


  0.7 MG/DL


(0.55-1.30)


 


Estimat Glomerular Filtration


Rate  mL/min (>60)  


 


 


Glucose Level


  90 MG/DL


()


 


Calcium Level


  8.8 MG/DL


(8.5-10.1)








Height (Feet):  5


Height (Inches):  1.00


Weight (Pounds):  96


General Appearance:  no apparent distress


Cardiovascular:  normal rate


Respiratory/Chest:  normal breath sounds, no respiratory distress


Abdominal Exam:  normal bowel sounds, non tender, soft


Extremities:  non-tender


Objective


Reported by the RN the patient tolerated approximately 25% of her meals











Malachi Mcqueen NP Sep 20, 2019 10:32

## 2019-09-20 NOTE — INFECTIOUS DISEASES PROG NOTE
Assessment/Plan


Assessment/Plan


83 yo female with PMHx of DM and failure  to thrive who was admitted to the 

hospital on 9/14/19. 





C. diff Diarrhea


   Toxin assay Pos 9/16/19


  


UTI


   UA (+)


   Urine Cx 9/14/19 - E,. coli (Sen to cefazolin)








DM


Failure to thrive





Plan





- Continue PO Vancomycin #4/10 ( End date 9/26/19)





      - 9/20/19 SP Ceftriaxone #6








- OK to D/C from an ID perspective 





- Monitor BMs





- Monitor CBC and Temps





Thank you for this consult. We will continue to follow the patient with you.





Subjective


Allergies:  


Coded Allergies:  


     LISINOPRIL (Verified  Allergy, Unknown, 9/14/19)


Subjective


Afebrile


No leukocytosis


BM x 0 yesterday





Objective


Vital Signs





Last 24 Hour Vital Signs








  Date Time  Temp Pulse Resp B/P (MAP) Pulse Ox O2 Delivery O2 Flow Rate FiO2


 


9/20/19 12:00 98.3 63 20 145/55 (85) 99   


 


9/20/19 09:00      Room Air  


 


9/20/19 08:38    113/60    


 


9/20/19 08:00 98.3 69 20 113/60 (77) 99   


 


9/20/19 04:00 99.0 73 20 140/77 (98) 96   


 


9/19/19 23:56 98.5 68 18 144/60 (88) 96   


 


9/19/19 20:50      Room Air  


 


9/19/19 20:00 99.4 67 20 160/60 (93) 97   


 


9/19/19 16:00 99.3 64 20 137/59 (85) 99   








Height (Feet):  5


Height (Inches):  1.00


Weight (Pounds):  96


Objective


GEN: NAD


HEENT: NCAT, MMM, No scleral icterus


CHEST: CTAB, No W/C


HEART: RRR, S1, S2, 


Abd: Soft, NT, ND, +BS, No HSM





Laboratory Tests








Test


  9/20/19


04:57


 


White Blood Count


  8.7 K/UL


(4.8-10.8)


 


Red Blood Count


  4.40 M/UL


(4.20-5.40)


 


Hemoglobin


  13.3 G/DL


(12.0-16.0)


 


Hematocrit


  40.1 %


(37.0-47.0)


 


Mean Corpuscular Volume 91 FL (80-99)  


 


Mean Corpuscular Hemoglobin


  30.2 PG


(27.0-31.0)


 


Mean Corpuscular Hemoglobin


Concent 33.2 G/DL


(32.0-36.0)


 


Red Cell Distribution Width


  12.5 %


(11.6-14.8)


 


Platelet Count


  192 K/UL


(150-450)


 


Mean Platelet Volume


  6.6 FL


(6.5-10.1)


 


Neutrophils (%) (Auto)


  82.7 %


(45.0-75.0)  H


 


Lymphocytes (%) (Auto)


  11.6 %


(20.0-45.0)  L


 


Monocytes (%) (Auto)


  5.0 %


(1.0-10.0)


 


Eosinophils (%) (Auto)


  0.0 %


(0.0-3.0)


 


Basophils (%) (Auto)


  0.7 %


(0.0-2.0)


 


Sodium Level


  136 MMOL/L


(136-145)


 


Potassium Level


  3.5 MMOL/L


(3.5-5.1)


 


Chloride Level


  100 MMOL/L


()


 


Carbon Dioxide Level


  24 MMOL/L


(21-32)


 


Anion Gap


  12 mmol/L


(5-15)


 


Blood Urea Nitrogen


  17 mg/dL


(7-18)


 


Creatinine


  0.7 MG/DL


(0.55-1.30)


 


Estimat Glomerular Filtration


Rate  mL/min (>60)  


 


 


Glucose Level


  90 MG/DL


()


 


Calcium Level


  8.8 MG/DL


(8.5-10.1)











Current Medications








 Medications


  (Trade)  Dose


 Ordered  Sig/Trent


 Route


 PRN Reason  Start Time


 Stop Time Status Last Admin


Dose Admin


 


 Acetaminophen


  (Tylenol)  650 mg  Q4H  PRN


 ORAL


 fever  9/15/19 08:30


 10/15/19 08:29   


 


 


 Ceftriaxone


 Sodium 1 gm/


 Dextrose  55 ml @ 


 110 mls/hr  Q24H


 IVPB


   9/15/19 21:00


 9/22/19 20:59  9/19/19 20:31


 


 


 Clonidine HCl


  (Catapres Tab)  0.1 mg  Q6H  PRN


 ORAL


 sbp above 160  9/15/19 11:15


 10/15/19 11:14  9/19/19 05:18


 


 


 Dextrose


  (Dextrose 50%)  25 ml  Q30M  PRN


 IV


 Hypoglycemia  9/15/19 08:30


 10/15/19 08:29   


 


 


 Dextrose


  (Dextrose 50%)  50 ml  Q30M  PRN


 IV


 Hypoglycemia  9/15/19 08:30


 10/15/19 08:29   


 


 


 Dronabinol


  (Marinol)  2.5 mg  TID


 ORAL


   9/17/19 13:00


 10/17/19 12:59  9/20/19 12:26


 


 


 Heparin Sodium


  (Porcine)


  (Heparin 5000


 units/ml)  5,000 units  EVERY 12  HOURS


 SUBQ


   9/15/19 09:00


 10/15/19 08:59  9/20/19 08:40


 


 


 Insulin Aspart


  (NovoLOG)    BEFORE MEALS AND  HS


 SUBQ


   9/15/19 11:30


 10/15/19 11:29  9/19/19 20:33


 


 


 Lorazepam


  (Ativan 2mg/ml


 1ml)  0.5 mg  Q4H  PRN


 IV


 For Anxiety  9/15/19 08:30


 9/22/19 08:29   


 


 


 Losartan Potassium


  (Cozaar)  25 mg  DAILY


 ORAL


   9/15/19 12:00


 10/15/19 11:59  9/20/19 08:38


 


 


 Morphine Sulfate


  (Morphine


 Sulfate)  1 mg  Q4H  PRN


 IVP


 For Pain  9/15/19 08:30


 9/22/19 08:29   


 


 


 Ondansetron HCl


  (Zofran)  4 mg  Q6H  PRN


 IVP


 Nausea & Vomiting  9/15/19 08:30


 10/15/19 08:29   


 


 


 Polyethylene


 Glycol


  (Miralax)  17 gm  HSPRN  PRN


 ORAL


 Constipation  9/15/19 08:30


 10/15/19 08:29   


 


 


 Sodium Chloride  1,000 ml @ 


 50 mls/hr  Q20H


 IV


   9/15/19 11:15


 10/15/19 11:14  9/20/19 11:20


 


 


 Vancomycin HCl


  (Firvanq)  125 mg  FOUR TIMES A  DAY


 ORAL


   9/17/19 13:00


 9/24/19 12:59  9/20/19 12:26


 


 


 Zolpidem Tartrate


  (Ambien)  5 mg  HSPRN  PRN


 ORAL


 Insomnia  9/15/19 08:30


 9/22/19 08:29   


 

















Truong Woods MD Sep 20, 2019 14:36

## 2019-09-21 VITALS — SYSTOLIC BLOOD PRESSURE: 150 MMHG | DIASTOLIC BLOOD PRESSURE: 60 MMHG

## 2019-09-21 VITALS — DIASTOLIC BLOOD PRESSURE: 73 MMHG | SYSTOLIC BLOOD PRESSURE: 144 MMHG

## 2019-09-21 VITALS — SYSTOLIC BLOOD PRESSURE: 143 MMHG | DIASTOLIC BLOOD PRESSURE: 57 MMHG

## 2019-09-21 VITALS — SYSTOLIC BLOOD PRESSURE: 157 MMHG | DIASTOLIC BLOOD PRESSURE: 69 MMHG

## 2019-09-21 VITALS — DIASTOLIC BLOOD PRESSURE: 54 MMHG | SYSTOLIC BLOOD PRESSURE: 104 MMHG

## 2019-09-21 VITALS — DIASTOLIC BLOOD PRESSURE: 72 MMHG | SYSTOLIC BLOOD PRESSURE: 155 MMHG

## 2019-09-21 LAB
ADD MANUAL DIFF: NO
ANION GAP SERPL CALC-SCNC: 10 MMOL/L (ref 5–15)
BASOPHILS NFR BLD AUTO: 0.6 % (ref 0–2)
BUN SERPL-MCNC: 15 MG/DL (ref 7–18)
CALCIUM SERPL-MCNC: 8.8 MG/DL (ref 8.5–10.1)
CHLORIDE SERPL-SCNC: 103 MMOL/L (ref 98–107)
CO2 SERPL-SCNC: 24 MMOL/L (ref 21–32)
CREAT SERPL-MCNC: 0.5 MG/DL (ref 0.55–1.3)
EOSINOPHIL NFR BLD AUTO: 0 % (ref 0–3)
ERYTHROCYTE [DISTWIDTH] IN BLOOD BY AUTOMATED COUNT: 12.3 % (ref 11.6–14.8)
HCT VFR BLD CALC: 39.9 % (ref 37–47)
HGB BLD-MCNC: 13.3 G/DL (ref 12–16)
LYMPHOCYTES NFR BLD AUTO: 10 % (ref 20–45)
MCV RBC AUTO: 91 FL (ref 80–99)
MONOCYTES NFR BLD AUTO: 8 % (ref 1–10)
NEUTROPHILS NFR BLD AUTO: 81.4 % (ref 45–75)
PLATELET # BLD: 177 K/UL (ref 150–450)
POTASSIUM SERPL-SCNC: 3.8 MMOL/L (ref 3.5–5.1)
RBC # BLD AUTO: 4.39 M/UL (ref 4.2–5.4)
SODIUM SERPL-SCNC: 137 MMOL/L (ref 136–145)
WBC # BLD AUTO: 9.1 K/UL (ref 4.8–10.8)

## 2019-09-21 RX ADMIN — HEPARIN SODIUM SCH UNITS: 5000 INJECTION INTRAVENOUS; SUBCUTANEOUS at 09:43

## 2019-09-21 RX ADMIN — DEXTROSE AND SODIUM CHLORIDE SCH MLS/HR: 5; .45 INJECTION, SOLUTION INTRAVENOUS at 16:27

## 2019-09-21 RX ADMIN — DRONABINOL SCH MG: 2.5 CAPSULE ORAL at 13:00

## 2019-09-21 RX ADMIN — INSULIN ASPART SCH UNITS: 100 INJECTION, SOLUTION INTRAVENOUS; SUBCUTANEOUS at 20:15

## 2019-09-21 RX ADMIN — VANCOMYCIN HYDROCHLORIDE SCH MG: 500 INJECTION, POWDER, LYOPHILIZED, FOR SOLUTION INTRAVENOUS at 09:42

## 2019-09-21 RX ADMIN — INSULIN ASPART SCH UNITS: 100 INJECTION, SOLUTION INTRAVENOUS; SUBCUTANEOUS at 11:30

## 2019-09-21 RX ADMIN — HEPARIN SODIUM SCH UNITS: 5000 INJECTION INTRAVENOUS; SUBCUTANEOUS at 20:15

## 2019-09-21 RX ADMIN — DRONABINOL SCH MG: 2.5 CAPSULE ORAL at 09:42

## 2019-09-21 RX ADMIN — VANCOMYCIN HYDROCHLORIDE SCH MG: 500 INJECTION, POWDER, LYOPHILIZED, FOR SOLUTION INTRAVENOUS at 20:13

## 2019-09-21 RX ADMIN — VANCOMYCIN HYDROCHLORIDE SCH MG: 500 INJECTION, POWDER, LYOPHILIZED, FOR SOLUTION INTRAVENOUS at 18:00

## 2019-09-21 RX ADMIN — VANCOMYCIN HYDROCHLORIDE SCH MG: 500 INJECTION, POWDER, LYOPHILIZED, FOR SOLUTION INTRAVENOUS at 13:00

## 2019-09-21 RX ADMIN — INSULIN ASPART SCH UNITS: 100 INJECTION, SOLUTION INTRAVENOUS; SUBCUTANEOUS at 16:30

## 2019-09-21 RX ADMIN — LOSARTAN POTASSIUM SCH MG: 25 TABLET, FILM COATED ORAL at 09:42

## 2019-09-21 RX ADMIN — INSULIN ASPART SCH UNITS: 100 INJECTION, SOLUTION INTRAVENOUS; SUBCUTANEOUS at 06:21

## 2019-09-21 RX ADMIN — DRONABINOL SCH MG: 2.5 CAPSULE ORAL at 18:00

## 2019-09-21 NOTE — NUR
NURSE NOTES:

patient refused dinner. the daughter tried to feed her but patient  did not take any foods. 
RN held evening medications. Dr. apodaca is aware patient has a poor po intake and family 
members are reconsidering PEG placement.

## 2019-09-21 NOTE — NUR
NURSE NOTES:

patient has not eaten well. family tried to feed her diet but patient would not open her 
mouth. RN held noon medications at this time. will continue to monitor patient's condition.

## 2019-09-21 NOTE — NUR
NURSE NOTES:

patient has poor po intake. DM patient. BS was 84@1130. notified dr. apodaca and received new 
order start D5 1/2 ns @50. order noted and carried out.

## 2019-09-21 NOTE — NUR
NURSE NOTES:

Pt received in bed eyes open and family at bedside, pt family reconsidering PEG placement 
and want to cancel CT scan of the head, voicemail left for Dr. Carr, will continue to 
monitor.

## 2019-09-21 NOTE — CONSULTATION
DATE OF CONSULTATION:  09/20/2019

NOTE:  VERY POOR AUDIO



NEUROLOGIC CONSULTATION



CONSULTING PHYSICIAN:  Aaron Carr M.D.



CHIEF COMPLAINT:  This is an 84-year-old right-handed -American

woman with dementia, probably Alzheimer disease for at least 5 years or

longer, with a chief complaint of change in her mental status beginning on

Saturday.  The patient was recently found to have urinary tract

infection.



The patient was brought in by the EMS from the [facility]

with reduced appetite and possible fever, which was _____ about 99.5

degrees in the last few days.  The patient, according to the daughter, was

talking, but according to the emergency room report, she was nonverbal.

The patient was lethargic as well.  Medications include _____,

ceftriaxone, _____.  She denies any history of alcohol use.  Denies any

_____.  Does not smoke.  She has a questionable _____.



She had an EKG on _____.  The blood culture did not grow _____.  The

urine culture grew out _____.



CBC was drawn.  _____.  Albumin level was 3.2.  Cholesterol was high at

347 with an LDL of 171 _____.  The patient's _____ was 1.6.  _____.



The patient was _____.  The patient has been _____.  _____.



PAST MEDICAL HISTORY/PAST MEDICAL ILLNESSES:

1. AODM, type 2.

2. Hypertension.

3. Asthma.

4. Failure to thrive.



MEDICATIONS:  She is on _____, regular insulin, _____, multivitamins,

potassium chloride.



SURGICAL HISTORY:  Right hip surgery, year unknown.



FAMILY HISTORY:  Unavailable.



_____.



PHYSICAL EXAMINATION:

GENERAL:  The patient is a well developed, chronically ill woman who is

lying in bed _____, eyes closed, _____.

VITAL SIGNS:  Blood pressure 150/85, temperature is 99.3 degrees, pulse is

67 and irregular, respiratory rate is 20.

HEENT:  _____.

LUNGS:  Appear to be clear.

CARDIOVASCULAR:  Heart sounds are distant.  There are no murmurs or rubs

appreciated.

ABDOMEN:  There is no tenderness.  _____ cannot be appreciated.

EXTREMITIES:  Appeared to be grossly intact.

NEUROLOGIC EXAMINATION:

MENTAL STATUS:  The patient _____ eyes closed.  The patient _____ open her

eyes.  Pupils are round.  There is no response to her name.

CRANIAL NERVE EXAMINATION:

CRANIAL NERVE II:  Could not be tested.

CRANIAL NERVE III, IV, AND VI:  Could not be tested.

CRANIAL NERVE V:  Corneals were intact to fine touch.

CRANIAL NERVE VII:  Facial strength appeared to be fairly symmetrical.

CRANIAL NERVE VIII TO XII:  Could not be tested.  _____.

MUSCLE EXAMINATION:  Muscle bulk is symmetrically decreased.  Tone is

minimally increased in the lower extremities and _____.  She could move

her arms normally, but there was no movement of her lower extremities.

Reflexes are +2 in the upper extremities, 0 at the knees, and +1 at least

at the right ankle.  Toe signs were mute at least on the right side.



IMPRESSION:  This patient has diffuse multifocal encephalopathies, probably

metabolic, superimposed on dementia, probably Alzheimer disease.  Family

does not know why she cannot walk.  Her daughter does not know if she ever

had an MRI or CT scan in the past.  I think we should probably do a CT

scan of the brain minimally at least to rule out hydrocephalus.  Metabolic

encephalopathy of course is related to her urinary tract infection other

than she has meningitis at this time.  The patient was started on

vancomycin and Rocephin.  Rocephin was started on 09/15/2019.  The

vancomycin was started on 09/17/2019.  She also has Ambien, losartan,

morphine, Zofran, clonidine 0.1 mg a day.



The patient is a DNR.  The patient has significant subdural _____, she

probably would not be an operative candidate anyway.  However, the CT scan

should be done to evaluate her _____.  At this point, I do not think she

has meningitis.  _____ and her blood cultures are negative.



PLAN:

1. CT scan of the brain.

2. Serum ammonia level.

3. I will follow the patient with you.

4. The patient had serum ammonia drawn, although I do not think she has

hepatic encephalopathy.



Thank you for this interesting case.









  ______________________________________________

  Aaron MD Bruno





DR:  MARGAUX

D:  09/20/2019 17:35

T:  09/21/2019 03:21

JOB#:  [id number]

CC:

## 2019-09-21 NOTE — NUR
NURSE NOTES:

received report from JESUS Juarez. patient in bed. sleeping. breathing even and unlabored. no 
facial grimacing noted. IV on LH24. running fluid. F/C draining. contact isolation. PPE at 
all times. bed in the lowest position. call light within reach. alarm on. will continue to 
provide plan of care.

## 2019-09-21 NOTE — GENERAL PROGRESS NOTE
Assessment/Plan


Problem List:  


(1) Diabetes


ICD Codes:  E11.9 - Type 2 diabetes mellitus without complications


SNOMED:  50900764


(2) Malnutrition


ICD Codes:  E46 - Unspecified protein-calorie malnutrition


SNOMED:  09357052


(3) Failure to thrive


SNOMED:  44907142


Qualifiers:  


   Qualified Codes:  R62.7 - Adult failure to thrive


(4) Weakness


ICD Codes:  R53.1 - Weakness


SNOMED:  68407633


(5) UTI (urinary tract infection)


ICD Codes:  N39.0 - Urinary tract infection, site not specified


SNOMED:  77511567


Qualifiers:  


   Qualified Codes:  N39.0 - Urinary tract infection, site not specified


Status:  unchanged


Assessment/Plan:


pt diet abx neuro eval merman  cbc bmp am pending gtube





Subjective


Constitutional:  Reports: weakness


Allergies:  


Coded Allergies:  


     LISINOPRIL (Verified  Allergy, Unknown, 9/14/19)


All Systems:  reviewed and negative except above


Subjective


calm in bed





Objective





Last 24 Hour Vital Signs








  Date Time  Temp Pulse Resp B/P (MAP) Pulse Ox O2 Delivery O2 Flow Rate FiO2


 


9/21/19 12:00 99.0 70 18 143/57 (85) 97   


 


9/21/19 09:42    144/73    


 


9/21/19 09:00      Room Air  


 


9/21/19 08:00  77 18 144/73 (96) 100   


 


9/21/19 04:00 98.5 73 21 150/60 (90) 95   


 


9/21/19 00:00 98.0 65 20 155/72 (99) 95   


 


9/20/19 21:00      Room Air  


 


9/20/19 20:00 98.4 66 20 151/63 (92) 95   

















Intake and Output  


 


 9/20/19 9/21/19





 19:00 07:00


 


Intake Total 670 ml 720 ml


 


Output Total 200 ml 150 ml


 


Balance 470 ml 570 ml


 


  


 


Intake Oral 120 ml 120 ml


 


IV Total 550 ml 600 ml


 


Output Urine Total 200 ml 150 ml


 


# Voids 2 1


 


# Bowel Movements  1








Laboratory Tests


9/20/19 18:05: Ammonia < 10L


9/21/19 05:50: 


White Blood Count 9.1, Red Blood Count 4.39, Hemoglobin 13.3, Hematocrit 39.9, 

Mean Corpuscular Volume 91, Mean Corpuscular Hemoglobin 30.4, Mean Corpuscular 

Hemoglobin Concent 33.5, Red Cell Distribution Width 12.3, Platelet Count 177, 

Mean Platelet Volume 6.4L, Neutrophils (%) (Auto) 81.4H, Lymphocytes (%) (Auto) 

10.0L, Monocytes (%) (Auto) 8.0, Eosinophils (%) (Auto) 0.0, Basophils (%) (Auto

) 0.6, Sodium Level 137, Potassium Level 3.8, Chloride Level 103, Carbon 

Dioxide Level 24, Anion Gap 10, Blood Urea Nitrogen 15, Creatinine 0.5L, 

Estimat Glomerular Filtration Rate , Glucose Level 93, Calcium Level 8.8


Height (Feet):  5


Height (Inches):  1.00


Weight (Pounds):  96


General Appearance:  lethargic


EENT:  normal ENT inspection


Neck:  normal alignment


Cardiovascular:  normal peripheral pulses, normal rate, regular rhythm


Respiratory/Chest:  chest wall non-tender, lungs clear, normal breath sounds


Abdomen:  normal bowel sounds, non tender, soft


Extremities:  normal inspection


Edema:  no edema noted Arm (L), no edema noted Arm (R), no edema noted Leg (L), 

no edema noted Leg (R), no edema noted Pedal (L), no edema noted Pedal (R), no 

edema noted Generalized


Neurologic:  motor weakness


Skin:  normal pigmentation, warm/dry











Chino Pineda DO Sep 21, 2019 16:17

## 2019-09-21 NOTE — PULMONOLOGY PROGRESS NOTE
Assessment/Plan


Problems:  


(1) Failure to thrive


(2) At high risk for aspiration


(3) Diabetes


(4) Protein-calorie malnutrition, severe


(5) Limited mobility in bed


(6) Flexion contractures


Assessment/Plan


aspiration precaution


symptomatic treatment


sliding scale


diabetic diet


check electrolytes


titrate fio2 to sat of 92%\





Calorie count reviewed nutritional needs are not met


Family refuses PEG


One-to-one feeder


 


discussed with pts daughter. she is out of town, family considering PEG 

placement


dc planning





Subjective


ROS Limited/Unobtainable:  Yes


Interval Events:  looks comfortable


Allergies:  


Coded Allergies:  


     LISINOPRIL (Verified  Allergy, Unknown, 9/14/19)





Objective





Last 24 Hour Vital Signs








  Date Time  Temp Pulse Resp B/P (MAP) Pulse Ox O2 Delivery O2 Flow Rate FiO2


 


9/21/19 04:00 98.5 73 21 150/60 (90) 95   


 


9/21/19 00:00 98.0 65 20 155/72 (99) 95   


 


9/20/19 21:00      Room Air  


 


9/20/19 20:00 98.4 66 20 151/63 (92) 95   


 


9/20/19 16:00 99.2 71 20 113/85 (94) 99   


 


9/20/19 12:00 98.3 63 20 145/55 (85) 99   

















Intake and Output  


 


 9/20/19 9/21/19





 18:59 06:59


 


Intake Total 720 ml 720 ml


 


Output Total 200 ml 150 ml


 


Balance 520 ml 570 ml


 


  


 


Intake Oral 120 ml 120 ml


 


IV Total 600 ml 600 ml


 


Output Urine Total 200 ml 150 ml


 


# Voids 2 1


 


# Bowel Movements  1








Objective


General Appearance:  cachectic 


HEENT:  normocephalic, atraumatic


Respiratory/Chest:  chest wall non-tender, lungs clear


Breasts:  no masses


Cardiovascular:  normal peripheral pulses


Abdomen:  normal bowel sounds, soft, non tender


Genitourinary:  normal external genitalia


Extremities:  no clubbing


Neurologic/Psychiatric:  CNs II-XII grossly normal


Lymphatic:  no neck adenopathy


Laboratory Tests


9/20/19 18:05: Ammonia < 10L


9/21/19 05:50: 


White Blood Count 9.1, Red Blood Count 4.39, Hemoglobin 13.3, Hematocrit 39.9, 

Mean Corpuscular Volume 91, Mean Corpuscular Hemoglobin 30.4, Mean Corpuscular 

Hemoglobin Concent 33.5, Red Cell Distribution Width 12.3, Platelet Count 177, 

Mean Platelet Volume 6.4L, Neutrophils (%) (Auto) 81.4H, Lymphocytes (%) (Auto) 

10.0L, Monocytes (%) (Auto) 8.0, Eosinophils (%) (Auto) 0.0, Basophils (%) (Auto

) 0.6, Sodium Level 137, Potassium Level 3.8, Chloride Level 103, Carbon 

Dioxide Level 24, Anion Gap 10, Blood Urea Nitrogen 15, Creatinine 0.5L, 

Estimat Glomerular Filtration Rate , Glucose Level 93, Calcium Level 8.8





Current Medications








 Medications


  (Trade)  Dose


 Ordered  Sig/Trent


 Route


 PRN Reason  Start Time


 Stop Time Status Last Admin


Dose Admin


 


 Acetaminophen


  (Tylenol)  650 mg  Q4H  PRN


 ORAL


 fever  9/15/19 08:30


 10/15/19 08:29   


 


 


 Clonidine HCl


  (Catapres Tab)  0.1 mg  Q6H  PRN


 ORAL


 sbp above 160  9/15/19 11:15


 10/15/19 11:14  9/19/19 05:18


 


 


 Dextrose


  (Dextrose 50%)  25 ml  Q30M  PRN


 IV


 Hypoglycemia  9/15/19 08:30


 10/15/19 08:29   


 


 


 Dextrose


  (Dextrose 50%)  50 ml  Q30M  PRN


 IV


 Hypoglycemia  9/15/19 08:30


 10/15/19 08:29   


 


 


 Dronabinol


  (Marinol)  2.5 mg  TID


 ORAL


   9/17/19 13:00


 10/17/19 12:59  9/20/19 17:56


 


 


 Heparin Sodium


  (Porcine)


  (Heparin 5000


 units/ml)  5,000 units  EVERY 12  HOURS


 SUBQ


   9/15/19 09:00


 10/15/19 08:59  9/20/19 20:50


 


 


 Insulin Aspart


  (NovoLOG)    BEFORE MEALS AND  HS


 SUBQ


   9/15/19 11:30


 10/15/19 11:29  9/19/19 20:33


 


 


 Iopamidol


  (Isovue-300


 100ml)  100 ml  NOW  PRN


 INJ


 Radiology Procedure  9/20/19 17:15


 9/22/19 17:04   


 


 


 Lorazepam


  (Ativan 2mg/ml


 1ml)  0.5 mg  Q4H  PRN


 IV


 For Anxiety  9/15/19 08:30


 9/22/19 08:29   


 


 


 Losartan Potassium


  (Cozaar)  25 mg  DAILY


 ORAL


   9/15/19 12:00


 10/15/19 11:59  9/20/19 08:38


 


 


 Morphine Sulfate


  (Morphine


 Sulfate)  1 mg  Q4H  PRN


 IVP


 For Pain  9/15/19 08:30


 9/22/19 08:29   


 


 


 Ondansetron HCl


  (Zofran)  4 mg  Q6H  PRN


 IVP


 Nausea & Vomiting  9/15/19 08:30


 10/15/19 08:29   


 


 


 Polyethylene


 Glycol


  (Miralax)  17 gm  HSPRN  PRN


 ORAL


 Constipation  9/15/19 08:30


 10/15/19 08:29   


 


 


 Sodium Chloride  1,000 ml @ 


 50 mls/hr  Q20H


 IV


   9/15/19 11:15


 10/15/19 11:14  9/21/19 06:47


 


 


 Vancomycin HCl


  (Firvanq)  125 mg  FOUR TIMES A  DAY


 ORAL


   9/17/19 13:00


 9/26/19 23:59  9/20/19 20:48


 


 


 Zolpidem Tartrate


  (Ambien)  5 mg  HSPRN  PRN


 ORAL


 Insomnia  9/15/19 08:30


 9/22/19 08:29   


 

















Chau Fitzgerald MD Sep 21, 2019 09:35

## 2019-09-21 NOTE — NUR
NURSE NOTES:

patient's family member asked cancel the CT scan. they discussed that taking CT scan is not 
benefit for the patient at this time. they are reconsidering peg placement and would talk to 
DR. wooten on monday.  RN  Called and left message to Dr. Carr to get cancel order of CT 
scan and waiting for returning call from MD.

## 2019-09-22 VITALS — DIASTOLIC BLOOD PRESSURE: 85 MMHG | SYSTOLIC BLOOD PRESSURE: 157 MMHG

## 2019-09-22 VITALS — DIASTOLIC BLOOD PRESSURE: 82 MMHG | SYSTOLIC BLOOD PRESSURE: 126 MMHG

## 2019-09-22 VITALS — SYSTOLIC BLOOD PRESSURE: 142 MMHG | DIASTOLIC BLOOD PRESSURE: 78 MMHG

## 2019-09-22 VITALS — SYSTOLIC BLOOD PRESSURE: 171 MMHG | DIASTOLIC BLOOD PRESSURE: 67 MMHG

## 2019-09-22 VITALS — SYSTOLIC BLOOD PRESSURE: 125 MMHG | DIASTOLIC BLOOD PRESSURE: 70 MMHG

## 2019-09-22 VITALS — DIASTOLIC BLOOD PRESSURE: 78 MMHG | SYSTOLIC BLOOD PRESSURE: 157 MMHG

## 2019-09-22 LAB
ADD MANUAL DIFF: NO
ANION GAP SERPL CALC-SCNC: 4 MMOL/L (ref 5–15)
BASOPHILS NFR BLD AUTO: 0.3 % (ref 0–2)
BUN SERPL-MCNC: 11 MG/DL (ref 7–18)
CALCIUM SERPL-MCNC: 8.5 MG/DL (ref 8.5–10.1)
CHLORIDE SERPL-SCNC: 104 MMOL/L (ref 98–107)
CO2 SERPL-SCNC: 29 MMOL/L (ref 21–32)
CREAT SERPL-MCNC: 0.5 MG/DL (ref 0.55–1.3)
EOSINOPHIL NFR BLD AUTO: 0.1 % (ref 0–3)
ERYTHROCYTE [DISTWIDTH] IN BLOOD BY AUTOMATED COUNT: 12.4 % (ref 11.6–14.8)
HCT VFR BLD CALC: 40.1 % (ref 37–47)
HGB BLD-MCNC: 13.5 G/DL (ref 12–16)
LYMPHOCYTES NFR BLD AUTO: 12.6 % (ref 20–45)
MCV RBC AUTO: 90 FL (ref 80–99)
MONOCYTES NFR BLD AUTO: 13.8 % (ref 1–10)
NEUTROPHILS NFR BLD AUTO: 73.1 % (ref 45–75)
PLATELET # BLD: 176 K/UL (ref 150–450)
POTASSIUM SERPL-SCNC: 3.4 MMOL/L (ref 3.5–5.1)
RBC # BLD AUTO: 4.44 M/UL (ref 4.2–5.4)
SODIUM SERPL-SCNC: 137 MMOL/L (ref 136–145)
WBC # BLD AUTO: 7.6 K/UL (ref 4.8–10.8)

## 2019-09-22 RX ADMIN — VANCOMYCIN HYDROCHLORIDE SCH MG: 500 INJECTION, POWDER, LYOPHILIZED, FOR SOLUTION INTRAVENOUS at 08:50

## 2019-09-22 RX ADMIN — LOSARTAN POTASSIUM SCH MG: 25 TABLET, FILM COATED ORAL at 08:50

## 2019-09-22 RX ADMIN — DRONABINOL SCH MG: 2.5 CAPSULE ORAL at 13:27

## 2019-09-22 RX ADMIN — INSULIN ASPART SCH UNITS: 100 INJECTION, SOLUTION INTRAVENOUS; SUBCUTANEOUS at 16:30

## 2019-09-22 RX ADMIN — VANCOMYCIN HYDROCHLORIDE SCH MG: 500 INJECTION, POWDER, LYOPHILIZED, FOR SOLUTION INTRAVENOUS at 16:56

## 2019-09-22 RX ADMIN — DRONABINOL SCH MG: 2.5 CAPSULE ORAL at 16:56

## 2019-09-22 RX ADMIN — HEPARIN SODIUM SCH UNITS: 5000 INJECTION INTRAVENOUS; SUBCUTANEOUS at 08:52

## 2019-09-22 RX ADMIN — INSULIN ASPART SCH UNITS: 100 INJECTION, SOLUTION INTRAVENOUS; SUBCUTANEOUS at 21:08

## 2019-09-22 RX ADMIN — VANCOMYCIN HYDROCHLORIDE SCH MG: 500 INJECTION, POWDER, LYOPHILIZED, FOR SOLUTION INTRAVENOUS at 13:27

## 2019-09-22 RX ADMIN — INSULIN ASPART SCH UNITS: 100 INJECTION, SOLUTION INTRAVENOUS; SUBCUTANEOUS at 06:31

## 2019-09-22 RX ADMIN — HEPARIN SODIUM SCH UNITS: 5000 INJECTION INTRAVENOUS; SUBCUTANEOUS at 21:08

## 2019-09-22 RX ADMIN — DRONABINOL SCH MG: 2.5 CAPSULE ORAL at 08:49

## 2019-09-22 RX ADMIN — DEXTROSE AND SODIUM CHLORIDE SCH MLS/HR: 5; .45 INJECTION, SOLUTION INTRAVENOUS at 12:05

## 2019-09-22 RX ADMIN — DEXTROSE AND SODIUM CHLORIDE SCH MLS/HR: 5; .45 INJECTION, SOLUTION INTRAVENOUS at 16:55

## 2019-09-22 RX ADMIN — INSULIN ASPART SCH UNITS: 100 INJECTION, SOLUTION INTRAVENOUS; SUBCUTANEOUS at 13:31

## 2019-09-22 RX ADMIN — VANCOMYCIN HYDROCHLORIDE SCH MG: 500 INJECTION, POWDER, LYOPHILIZED, FOR SOLUTION INTRAVENOUS at 20:47

## 2019-09-22 NOTE — NUR
NURSE NOTES:

Received patient in bed, total care, family at bed side, confused, disoriented, bilateral 
legs m=noted with contractures, Hollingsworth catheter is in place, draining well. IV site is clean 
dry and intact. Call light is within reach, bed is lowered, locked and alarm is on. Will 
continue to monitor for comfort and safety.

## 2019-09-22 NOTE — GENERAL PROGRESS NOTE
Assessment/Plan


Problem List:  


(1) Diabetes


ICD Codes:  E11.9 - Type 2 diabetes mellitus without complications


SNOMED:  57808120


(2) Malnutrition


ICD Codes:  E46 - Unspecified protein-calorie malnutrition


SNOMED:  29667468


(3) Failure to thrive


SNOMED:  76746706


Qualifiers:  


   Qualified Codes:  R62.7 - Adult failure to thrive


(4) Weakness


ICD Codes:  R53.1 - Weakness


SNOMED:  63099931


(5) UTI (urinary tract infection)


ICD Codes:  N39.0 - Urinary tract infection, site not specified


SNOMED:  62068124


Qualifiers:  


   Qualified Codes:  N39.0 - Urinary tract infection, site not specified


Status:  unchanged


Assessment/Plan:


pt diet abx neuro eval merman  cbc bmp am pending gtube





Subjective


Constitutional:  Reports: weakness


Allergies:  


Coded Allergies:  


     LISINOPRIL (Verified  Allergy, Unknown, 9/14/19)


All Systems:  reviewed and negative except above


Subjective


calm sleepy  in bed





Objective





Last 24 Hour Vital Signs








  Date Time  Temp Pulse Resp B/P (MAP) Pulse Ox O2 Delivery O2 Flow Rate FiO2


 


9/22/19 04:00 97.6 80 19 142/78 (99) 95   


 


9/22/19 00:00 98.5 84 18 125/70 (88) 95   


 


9/21/19 21:00      Room Air  


 


9/21/19 20:00 97.3 74 20 104/54 (71) 95   


 


9/21/19 16:00 99.4 69 18 157/69 (98) 100   


 


9/21/19 12:00 99.0 70 18 143/57 (85) 97   


 


9/21/19 09:42    144/73    


 


9/21/19 09:00      Room Air  


 


9/21/19 08:00  77 18 144/73 (96) 100   

















Intake and Output  


 


 9/21/19 9/22/19





 18:59 06:59


 


Intake Total 600 ml 650 ml


 


Output Total 175 ml 150 ml


 


Balance 425 ml 500 ml


 


  


 


Intake Oral 50 ml 50 ml


 


IV Total 550 ml 600 ml


 


Output Urine Total 175 ml 150 ml


 


# Voids  1


 


# Bowel Movements  1








Laboratory Tests


9/22/19 05:45: 


White Blood Count 7.6, Red Blood Count 4.44, Hemoglobin 13.5, Hematocrit 40.1, 

Mean Corpuscular Volume 90, Mean Corpuscular Hemoglobin 30.4, Mean Corpuscular 

Hemoglobin Concent 33.7, Red Cell Distribution Width 12.4, Platelet Count 176, 

Mean Platelet Volume 6.3L, Neutrophils (%) (Auto) 73.1, Lymphocytes (%) (Auto) 

12.6L, Monocytes (%) (Auto) 13.8H, Eosinophils (%) (Auto) 0.1, Basophils (%) (

Auto) 0.3, Sodium Level 137, Potassium Level 3.4L, Chloride Level 104, Carbon 

Dioxide Level 29, Anion Gap 4L, Blood Urea Nitrogen 11, Creatinine 0.5L, 

Estimat Glomerular Filtration Rate , Glucose Level 132H, Calcium Level 8.5


Height (Feet):  5


Height (Inches):  1.00


Weight (Pounds):  96


General Appearance:  lethargic


EENT:  normal ENT inspection


Neck:  normal alignment


Cardiovascular:  normal peripheral pulses, normal rate, regular rhythm


Respiratory/Chest:  chest wall non-tender, lungs clear, normal breath sounds


Abdomen:  normal bowel sounds, non tender, soft


Extremities:  normal inspection


Edema:  no edema noted Arm (L), no edema noted Arm (R), no edema noted Leg (L), 

no edema noted Leg (R), no edema noted Pedal (L), no edema noted Pedal (R), no 

edema noted Generalized


Neurologic:  motor weakness


Skin:  normal pigmentation, warm/dry











Chino Pineda DO Sep 22, 2019 07:56

## 2019-09-22 NOTE — INFECTIOUS DISEASES PROG NOTE
Assessment/Plan


Assessment/Plan


85 yo female with PMHx of DM and failure  to thrive who was admitted to the 

hospital on 9/14/19. 





C. diff Diarrhea


   Toxin assay Pos 9/16/19


  


UTI


   UA (+)


   Urine Cx 9/14/19 - E,. coli (Sen to cefazolin)








DM


Failure to thrive





Plan





- Continue PO Vancomycin #6/10 ( End date 9/26/19)





      - 9/20/19 SP Ceftriaxone #6








- OK to D/C from an ID perspective 





- Monitor BMs





- Monitor CBC and Temps





Thank you for this consult. We will continue to follow the patient with you.





Subjective


Allergies:  


Coded Allergies:  


     LISINOPRIL (Verified  Allergy, Unknown, 9/14/19)


Subjective


Afebrile


No leukocytosis





Objective


Vital Signs





Last 24 Hour Vital Signs








  Date Time  Temp Pulse Resp B/P (MAP) Pulse Ox O2 Delivery O2 Flow Rate FiO2


 


9/22/19 09:00      Room Air  


 


9/22/19 08:50    126/82    


 


9/22/19 08:00 98.1 73 18 126/82 (97) 100   


 


9/22/19 04:00 97.6 80 19 142/78 (99) 95   


 


9/22/19 00:00 98.5 84 18 125/70 (88) 95   


 


9/21/19 21:00      Room Air  


 


9/21/19 20:00 97.3 74 20 104/54 (71) 95   


 


9/21/19 16:00 99.4 69 18 157/69 (98) 100   


 


9/21/19 12:00 99.0 70 18 143/57 (85) 97   








Height (Feet):  5


Height (Inches):  1.00


Weight (Pounds):  96


Objective


GEN: NAD, Satting well


HEENT: NCAT, MMM, No scleral icterus


CHEST: CTAB, No W/C


HEART: RRR, S1, S2, 


Abd: Soft, NT, ND, +BS, No HSM





Laboratory Tests








Test


  9/22/19


05:45


 


White Blood Count


  7.6 K/UL


(4.8-10.8)


 


Red Blood Count


  4.44 M/UL


(4.20-5.40)


 


Hemoglobin


  13.5 G/DL


(12.0-16.0)


 


Hematocrit


  40.1 %


(37.0-47.0)


 


Mean Corpuscular Volume 90 FL (80-99)  


 


Mean Corpuscular Hemoglobin


  30.4 PG


(27.0-31.0)


 


Mean Corpuscular Hemoglobin


Concent 33.7 G/DL


(32.0-36.0)


 


Red Cell Distribution Width


  12.4 %


(11.6-14.8)


 


Platelet Count


  176 K/UL


(150-450)


 


Mean Platelet Volume


  6.3 FL


(6.5-10.1)  L


 


Neutrophils (%) (Auto)


  73.1 %


(45.0-75.0)


 


Lymphocytes (%) (Auto)


  12.6 %


(20.0-45.0)  L


 


Monocytes (%) (Auto)


  13.8 %


(1.0-10.0)  H


 


Eosinophils (%) (Auto)


  0.1 %


(0.0-3.0)


 


Basophils (%) (Auto)


  0.3 %


(0.0-2.0)


 


Sodium Level


  137 MMOL/L


(136-145)


 


Potassium Level


  3.4 MMOL/L


(3.5-5.1)  L


 


Chloride Level


  104 MMOL/L


()


 


Carbon Dioxide Level


  29 MMOL/L


(21-32)


 


Anion Gap


  4 mmol/L


(5-15)  L


 


Blood Urea Nitrogen


  11 mg/dL


(7-18)


 


Creatinine


  0.5 MG/DL


(0.55-1.30)  L


 


Estimat Glomerular Filtration


Rate  mL/min (>60)  


 


 


Glucose Level


  132 MG/DL


()  H


 


Calcium Level


  8.5 MG/DL


(8.5-10.1)











Current Medications








 Medications


  (Trade)  Dose


 Ordered  Sig/Trent


 Route


 PRN Reason  Start Time


 Stop Time Status Last Admin


Dose Admin


 


 Acetaminophen


  (Tylenol)  650 mg  Q4H  PRN


 ORAL


 fever  9/15/19 08:30


 10/15/19 08:29   


 


 


 Clonidine HCl


  (Catapres Tab)  0.1 mg  Q6H  PRN


 ORAL


 sbp above 160  9/15/19 11:15


 10/15/19 11:14  9/19/19 05:18


 


 


 Dextrose


  (Dextrose 50%)  25 ml  Q30M  PRN


 IV


 Hypoglycemia  9/15/19 08:30


 10/15/19 08:29   


 


 


 Dextrose


  (Dextrose 50%)  50 ml  Q30M  PRN


 IV


 Hypoglycemia  9/15/19 08:30


 10/15/19 08:29   


 


 


 Dextrose/Sodium


 Chloride  1,000 ml @ 


 50 mls/hr  Q20H


 IV


   9/21/19 16:05


 10/21/19 16:04  9/21/19 16:27


 


 


 Dronabinol


  (Marinol)  2.5 mg  TID


 ORAL


   9/17/19 13:00


 10/17/19 12:59  9/22/19 08:49


 


 


 Heparin Sodium


  (Porcine)


  (Heparin 5000


 units/ml)  5,000 units  EVERY 12  HOURS


 SUBQ


   9/15/19 09:00


 10/15/19 08:59  9/22/19 08:52


 


 


 Insulin Aspart


  (NovoLOG)    BEFORE MEALS AND  HS


 SUBQ


   9/15/19 11:30


 10/15/19 11:29  9/22/19 06:31


 


 


 Iopamidol


  (Isovue-300


 100ml)  100 ml  NOW  PRN


 INJ


 Radiology Procedure  9/20/19 17:15


 9/22/19 17:04   


 


 


 Losartan Potassium


  (Cozaar)  25 mg  DAILY


 ORAL


   9/15/19 12:00


 10/15/19 11:59  9/22/19 08:50


 


 


 Ondansetron HCl


  (Zofran)  4 mg  Q6H  PRN


 IVP


 Nausea & Vomiting  9/15/19 08:30


 10/15/19 08:29   


 


 


 Polyethylene


 Glycol


  (Miralax)  17 gm  HSPRN  PRN


 ORAL


 Constipation  9/15/19 08:30


 10/15/19 08:29   


 


 


 Vancomycin HCl


  (Firvanq)  125 mg  FOUR TIMES A  DAY


 ORAL


   9/17/19 13:00


 9/26/19 23:59  9/22/19 08:50


 

















Truong Woods MD Sep 22, 2019 10:33

## 2019-09-23 VITALS — DIASTOLIC BLOOD PRESSURE: 56 MMHG | SYSTOLIC BLOOD PRESSURE: 114 MMHG

## 2019-09-23 VITALS — SYSTOLIC BLOOD PRESSURE: 108 MMHG | DIASTOLIC BLOOD PRESSURE: 45 MMHG

## 2019-09-23 VITALS — DIASTOLIC BLOOD PRESSURE: 61 MMHG | SYSTOLIC BLOOD PRESSURE: 122 MMHG

## 2019-09-23 VITALS — DIASTOLIC BLOOD PRESSURE: 70 MMHG | SYSTOLIC BLOOD PRESSURE: 171 MMHG

## 2019-09-23 VITALS — DIASTOLIC BLOOD PRESSURE: 62 MMHG | SYSTOLIC BLOOD PRESSURE: 150 MMHG

## 2019-09-23 VITALS — DIASTOLIC BLOOD PRESSURE: 73 MMHG | SYSTOLIC BLOOD PRESSURE: 143 MMHG

## 2019-09-23 LAB
ADD MANUAL DIFF: NO
ANION GAP SERPL CALC-SCNC: 6 MMOL/L (ref 5–15)
BASOPHILS NFR BLD AUTO: 0.4 % (ref 0–2)
BUN SERPL-MCNC: 8 MG/DL (ref 7–18)
CALCIUM SERPL-MCNC: 8.9 MG/DL (ref 8.5–10.1)
CHLORIDE SERPL-SCNC: 104 MMOL/L (ref 98–107)
CO2 SERPL-SCNC: 30 MMOL/L (ref 21–32)
CREAT SERPL-MCNC: 0.4 MG/DL (ref 0.55–1.3)
EOSINOPHIL NFR BLD AUTO: 0.4 % (ref 0–3)
ERYTHROCYTE [DISTWIDTH] IN BLOOD BY AUTOMATED COUNT: 12.5 % (ref 11.6–14.8)
HCT VFR BLD CALC: 39.1 % (ref 37–47)
HGB BLD-MCNC: 12.9 G/DL (ref 12–16)
LYMPHOCYTES NFR BLD AUTO: 18.6 % (ref 20–45)
MCV RBC AUTO: 92 FL (ref 80–99)
MONOCYTES NFR BLD AUTO: 11.7 % (ref 1–10)
NEUTROPHILS NFR BLD AUTO: 69 % (ref 45–75)
PLATELET # BLD: 175 K/UL (ref 150–450)
POTASSIUM SERPL-SCNC: 3.5 MMOL/L (ref 3.5–5.1)
RBC # BLD AUTO: 4.27 M/UL (ref 4.2–5.4)
SODIUM SERPL-SCNC: 140 MMOL/L (ref 136–145)
WBC # BLD AUTO: 6.9 K/UL (ref 4.8–10.8)

## 2019-09-23 RX ADMIN — DRONABINOL SCH MG: 2.5 CAPSULE ORAL at 18:00

## 2019-09-23 RX ADMIN — INSULIN ASPART SCH UNITS: 100 INJECTION, SOLUTION INTRAVENOUS; SUBCUTANEOUS at 13:59

## 2019-09-23 RX ADMIN — VANCOMYCIN HYDROCHLORIDE SCH MG: 500 INJECTION, POWDER, LYOPHILIZED, FOR SOLUTION INTRAVENOUS at 18:00

## 2019-09-23 RX ADMIN — INSULIN ASPART SCH UNITS: 100 INJECTION, SOLUTION INTRAVENOUS; SUBCUTANEOUS at 05:56

## 2019-09-23 RX ADMIN — LOSARTAN POTASSIUM SCH MG: 25 TABLET, FILM COATED ORAL at 09:58

## 2019-09-23 RX ADMIN — VANCOMYCIN HYDROCHLORIDE SCH MG: 500 INJECTION, POWDER, LYOPHILIZED, FOR SOLUTION INTRAVENOUS at 09:57

## 2019-09-23 RX ADMIN — HEPARIN SODIUM SCH UNITS: 5000 INJECTION INTRAVENOUS; SUBCUTANEOUS at 21:43

## 2019-09-23 RX ADMIN — HEPARIN SODIUM SCH UNITS: 5000 INJECTION INTRAVENOUS; SUBCUTANEOUS at 10:00

## 2019-09-23 RX ADMIN — VANCOMYCIN HYDROCHLORIDE SCH MG: 500 INJECTION, POWDER, LYOPHILIZED, FOR SOLUTION INTRAVENOUS at 13:57

## 2019-09-23 RX ADMIN — DRONABINOL SCH MG: 2.5 CAPSULE ORAL at 13:57

## 2019-09-23 RX ADMIN — INSULIN ASPART SCH UNITS: 100 INJECTION, SOLUTION INTRAVENOUS; SUBCUTANEOUS at 16:30

## 2019-09-23 RX ADMIN — DRONABINOL SCH MG: 2.5 CAPSULE ORAL at 09:58

## 2019-09-23 RX ADMIN — VANCOMYCIN HYDROCHLORIDE SCH MG: 500 INJECTION, POWDER, LYOPHILIZED, FOR SOLUTION INTRAVENOUS at 21:44

## 2019-09-23 RX ADMIN — INSULIN ASPART SCH UNITS: 100 INJECTION, SOLUTION INTRAVENOUS; SUBCUTANEOUS at 21:00

## 2019-09-23 NOTE — GI PROGRESS NOTE
Assessment/Plan


Problems:  


(1) Weakness


ICD Codes:  R53.1 - Weakness


SNOMED:  28793311


(2) Malnutrition


ICD Codes:  E46 - Unspecified protein-calorie malnutrition


SNOMED:  96863989


(3) Failure to thrive


SNOMED:  85924236


Qualifiers:  


   Qualified Codes:  R62.7 - Adult failure to thrive


Status:  unchanged


Status Narrative


Discussed with Dr. Garg.


Assessment/Plan


Family as agreed to PEG.


- NPO, MN.


- hold all blood thinners


Calorie count reviewed nutritional needs are not met


okay for oral gratification


Push p.o.


Strict aspiration precautions


One-to-one feeder


Add Marinol


Follow labs





The patient was seen and examined at bedside and all new and available data was 

reviewed in the patients chart. I agree with the above findings, impression 

and plan.  (Patient seen earlier today. Signature stamp does not reflect 

patient encounter time.). - Shai Garg MD





Subjective


Subjective


Limited





Objective





Last 24 Hour Vital Signs








  Date Time  Temp Pulse Resp B/P (MAP) Pulse Ox O2 Delivery O2 Flow Rate FiO2


 


9/23/19 09:58    171/70    


 


9/23/19 09:58    171/70    


 


9/23/19 08:00 98.1 89 18 171/70 (103) 97   


 


9/23/19 04:00 98.6 62 19 150/62 (91)    


 


9/23/19 00:00 98.9 62 19 143/73 (96)    


 


9/22/19 21:00      Room Air  


 


9/22/19 20:48    171/67    


 


9/22/19 20:00 99.0 67 19 171/67 (101)    


 


9/22/19 16:00 99.2 70 18 157/78 (104) 98   

















Intake and Output  


 


 9/22/19 9/23/19





 18:59 06:59


 


Intake Total 100 ml 


 


Output Total 200 ml 


 


Balance -100 ml 


 


  


 


Intake Oral 100 ml 


 


Output Urine Total 200 ml 











Laboratory Tests








Test


  9/23/19


05:34


 


White Blood Count


  6.9 K/UL


(4.8-10.8)


 


Red Blood Count


  4.27 M/UL


(4.20-5.40)


 


Hemoglobin


  12.9 G/DL


(12.0-16.0)


 


Hematocrit


  39.1 %


(37.0-47.0)


 


Mean Corpuscular Volume 92 FL (80-99)  


 


Mean Corpuscular Hemoglobin


  30.1 PG


(27.0-31.0)


 


Mean Corpuscular Hemoglobin


Concent 32.9 G/DL


(32.0-36.0)


 


Red Cell Distribution Width


  12.5 %


(11.6-14.8)


 


Platelet Count


  175 K/UL


(150-450)


 


Mean Platelet Volume


  6.8 FL


(6.5-10.1)


 


Neutrophils (%) (Auto)


  69.0 %


(45.0-75.0)


 


Lymphocytes (%) (Auto)


  18.6 %


(20.0-45.0)  L


 


Monocytes (%) (Auto)


  11.7 %


(1.0-10.0)  H


 


Eosinophils (%) (Auto)


  0.4 %


(0.0-3.0)


 


Basophils (%) (Auto)


  0.4 %


(0.0-2.0)


 


Sodium Level


  140 MMOL/L


(136-145)


 


Potassium Level


  3.5 MMOL/L


(3.5-5.1)


 


Chloride Level


  104 MMOL/L


()


 


Carbon Dioxide Level


  30 MMOL/L


(21-32)


 


Anion Gap


  6 mmol/L


(5-15)


 


Blood Urea Nitrogen


  8 mg/dL (7-18)


 


 


Creatinine


  0.4 MG/DL


(0.55-1.30)  L


 


Estimat Glomerular Filtration


Rate  mL/min (>60)  


 


 


Glucose Level


  127 MG/DL


()  H


 


Calcium Level


  8.9 MG/DL


(8.5-10.1)








Height (Feet):  5


Height (Inches):  1.00


Weight (Pounds):  96


General Appearance:  no apparent distress


Cardiovascular:  normal rate


Respiratory/Chest:  normal breath sounds, no respiratory distress


Abdominal Exam:  normal bowel sounds, non tender, soft


Extremities:  non-tender


Objective


Reported by the RN the patient tolerated approximately 25% of her meals











Malachi Mcqueen NP Sep 23, 2019 12:32

## 2019-09-23 NOTE — GENERAL PROGRESS NOTE
Assessment/Plan


Problem List:  


(1) Diabetes


ICD Codes:  E11.9 - Type 2 diabetes mellitus without complications


SNOMED:  37873846


(2) Malnutrition


ICD Codes:  E46 - Unspecified protein-calorie malnutrition


SNOMED:  44502210


(3) Failure to thrive


SNOMED:  25700496


Qualifiers:  


   Qualified Codes:  R62.7 - Adult failure to thrive


(4) Weakness


ICD Codes:  R53.1 - Weakness


SNOMED:  61432232


(5) UTI (urinary tract infection)


ICD Codes:  N39.0 - Urinary tract infection, site not specified


SNOMED:  58281158


Qualifiers:  


   Qualified Codes:  N39.0 - Urinary tract infection, site not specified


Status:  unchanged


Assessment/Plan:


pt diet abx neuro eval merman  cbc bmp am pending gtube





Subjective


Constitutional:  Reports: weakness


Allergies:  


Coded Allergies:  


     LISINOPRIL (Verified  Allergy, Unknown, 9/14/19)


All Systems:  reviewed and negative except above


Subjective


calm sleepy  in bed





Objective





Last 24 Hour Vital Signs








  Date Time  Temp Pulse Resp B/P (MAP) Pulse Ox O2 Delivery O2 Flow Rate FiO2


 


9/23/19 04:00 98.6 62 19 150/62 (91)    


 


9/23/19 00:00 98.9 62 19 143/73 (96)    


 


9/22/19 21:00      Room Air  


 


9/22/19 20:48    171/67    


 


9/22/19 20:00 99.0 67 19 171/67 (101)    


 


9/22/19 16:00 99.2 70 18 157/78 (104) 98   


 


9/22/19 12:00 97.6 66 18 157/85 (109) 99   


 


9/22/19 09:00      Room Air  

















Intake and Output  


 


 9/22/19 9/23/19





 19:00 07:00


 


Intake Total 100 ml 


 


Output Total 200 ml 


 


Balance -100 ml 


 


  


 


Intake Oral 100 ml 


 


Output Urine Total 200 ml 








Laboratory Tests


9/23/19 05:34: 


White Blood Count 6.9, Red Blood Count 4.27, Hemoglobin 12.9, Hematocrit 39.1, 

Mean Corpuscular Volume 92, Mean Corpuscular Hemoglobin 30.1, Mean Corpuscular 

Hemoglobin Concent 32.9, Red Cell Distribution Width 12.5, Platelet Count 175, 

Mean Platelet Volume 6.8, Neutrophils (%) (Auto) 69.0, Lymphocytes (%) (Auto) 

18.6L, Monocytes (%) (Auto) 11.7H, Eosinophils (%) (Auto) 0.4, Basophils (%) (

Auto) 0.4, Sodium Level 140, Potassium Level 3.5, Chloride Level 104, Carbon 

Dioxide Level 30, Anion Gap 6, Blood Urea Nitrogen 8, Creatinine 0.4L, Estimat 

Glomerular Filtration Rate , Glucose Level 127H, Calcium Level 8.9


Height (Feet):  5


Height (Inches):  1.00


Weight (Pounds):  96


General Appearance:  lethargic


EENT:  normal ENT inspection


Neck:  normal alignment


Cardiovascular:  normal peripheral pulses, normal rate, regular rhythm


Respiratory/Chest:  chest wall non-tender, lungs clear, normal breath sounds


Abdomen:  normal bowel sounds, non tender, soft


Extremities:  normal inspection


Edema:  no edema noted Arm (L), no edema noted Arm (R), no edema noted Leg (L), 

no edema noted Leg (R), no edema noted Pedal (L), no edema noted Pedal (R), no 

edema noted Generalized


Neurologic:  motor weakness


Skin:  normal pigmentation, warm/dry











Chino Pineda DO Sep 23, 2019 08:57

## 2019-09-23 NOTE — PULMONOLOGY PROGRESS NOTE
Assessment/Plan


Problems:  


(1) Failure to thrive


(2) At high risk for aspiration


(3) Diabetes


(4) Protein-calorie malnutrition, severe


(5) Limited mobility in bed


(6) Flexion contractures


Assessment/Plan


very poor appetite


aspiration precaution


symptomatic treatment


sliding scale


diabetic diet


check electrolytes


titrate fio2 to sat of 92%\


 


discussed with pts daughter. she is out of town, family considering PEG 

placement


dc planning





Subjective


ROS Limited/Unobtainable:  No


Constitutional:  Reports: no symptoms


HEENT:  Repors: no symptoms


Respiratory:  Reports: no symptoms


Allergies:  


Coded Allergies:  


     LISINOPRIL (Verified  Allergy, Unknown, 9/14/19)





Objective





Last 24 Hour Vital Signs








  Date Time  Temp Pulse Resp B/P (MAP) Pulse Ox O2 Delivery O2 Flow Rate FiO2


 


9/23/19 09:58    171/70    


 


9/23/19 09:58    171/70    


 


9/23/19 08:00 98.1 89 18 171/70 (103) 97   


 


9/23/19 04:00 98.6 62 19 150/62 (91)    


 


9/23/19 00:00 98.9 62 19 143/73 (96)    


 


9/22/19 21:00      Room Air  


 


9/22/19 20:48    171/67    


 


9/22/19 20:00 99.0 67 19 171/67 (101)    


 


9/22/19 16:00 99.2 70 18 157/78 (104) 98   


 


9/22/19 12:00 97.6 66 18 157/85 (109) 99   

















Intake and Output  


 


 9/22/19 9/23/19





 18:59 06:59


 


Intake Total 100 ml 


 


Output Total 200 ml 


 


Balance -100 ml 


 


  


 


Intake Oral 100 ml 


 


Output Urine Total 200 ml 








Objective


General Appearance:  cachectic 


HEENT:  normocephalic, atraumatic


Respiratory/Chest:  chest wall non-tender, lungs clear


Breasts:  no masses


Cardiovascular:  normal peripheral pulses


Abdomen:  normal bowel sounds, soft, non tender


Genitourinary:  normal external genitalia


Extremities:  no clubbing


Neurologic/Psychiatric:  CNs II-XII grossly normal


Lymphatic:  no neck adenopathy


Laboratory Tests


9/23/19 05:34: 


White Blood Count 6.9, Red Blood Count 4.27, Hemoglobin 12.9, Hematocrit 39.1, 

Mean Corpuscular Volume 92, Mean Corpuscular Hemoglobin 30.1, Mean Corpuscular 

Hemoglobin Concent 32.9, Red Cell Distribution Width 12.5, Platelet Count 175, 

Mean Platelet Volume 6.8, Neutrophils (%) (Auto) 69.0, Lymphocytes (%) (Auto) 

18.6L, Monocytes (%) (Auto) 11.7H, Eosinophils (%) (Auto) 0.4, Basophils (%) (

Auto) 0.4, Sodium Level 140, Potassium Level 3.5, Chloride Level 104, Carbon 

Dioxide Level 30, Anion Gap 6, Blood Urea Nitrogen 8, Creatinine 0.4L, Estimat 

Glomerular Filtration Rate , Glucose Level 127H, Calcium Level 8.9





Current Medications








 Medications


  (Trade)  Dose


 Ordered  Sig/Trent


 Route


 PRN Reason  Start Time


 Stop Time Status Last Admin


Dose Admin


 


 Acetaminophen


  (Tylenol)  650 mg  Q4H  PRN


 ORAL


 fever  9/15/19 08:30


 10/15/19 08:29   


 


 


 Clonidine HCl


  (Catapres Tab)  0.1 mg  Q6H  PRN


 ORAL


 sbp above 160  9/15/19 11:15


 10/15/19 11:14  9/23/19 09:58


 


 


 Dextrose


  (Dextrose 50%)  25 ml  Q30M  PRN


 IV


 Hypoglycemia  9/15/19 08:30


 10/15/19 08:29   


 


 


 Dextrose


  (Dextrose 50%)  50 ml  Q30M  PRN


 IV


 Hypoglycemia  9/15/19 08:30


 10/15/19 08:29   


 


 


 Dextrose/Sodium


 Chloride  1,000 ml @ 


 50 mls/hr  Q20H


 IV


   9/21/19 16:05


 10/21/19 16:04  9/22/19 16:55


 


 


 Dronabinol


  (Marinol)  2.5 mg  TID


 ORAL


   9/17/19 13:00


 10/17/19 12:59  9/23/19 09:58


 


 


 Heparin Sodium


  (Porcine)


  (Heparin 5000


 units/ml)  5,000 units  EVERY 12  HOURS


 SUBQ


   9/15/19 09:00


 10/15/19 08:59  9/23/19 10:00


 


 


 Insulin Aspart


  (NovoLOG)    BEFORE MEALS AND  HS


 SUBQ


   9/15/19 11:30


 10/15/19 11:29  9/23/19 05:56


 


 


 Losartan Potassium


  (Cozaar)  25 mg  DAILY


 ORAL


   9/15/19 12:00


 10/15/19 11:59  9/23/19 09:58


 


 


 Ondansetron HCl


  (Zofran)  4 mg  Q6H  PRN


 IVP


 Nausea & Vomiting  9/15/19 08:30


 10/15/19 08:29   


 


 


 Polyethylene


 Glycol


  (Miralax)  17 gm  HSPRN  PRN


 ORAL


 Constipation  9/15/19 08:30


 10/15/19 08:29   


 


 


 Vancomycin HCl


  (Firvanq)  125 mg  FOUR TIMES A  DAY


 ORAL


   9/17/19 13:00


 9/26/19 23:59  9/23/19 09:57


 

















Chau Fitzgerald MD Sep 23, 2019 11:02

## 2019-09-23 NOTE — NUR
NURSE NOTES:

Received pt resting in bed. Pt is non verbal, on room air. Daughter is at bedside and she 
will sign PEG, EGD, and blood transfusion consent form later. [place] intact. IV site intact 
running with fluid. Bed locked, lowest position, side rails up x 2, call light within reach. 
Will continue to monitor.

## 2019-09-24 VITALS — DIASTOLIC BLOOD PRESSURE: 63 MMHG | SYSTOLIC BLOOD PRESSURE: 110 MMHG

## 2019-09-24 VITALS — DIASTOLIC BLOOD PRESSURE: 52 MMHG | SYSTOLIC BLOOD PRESSURE: 101 MMHG

## 2019-09-24 VITALS — DIASTOLIC BLOOD PRESSURE: 51 MMHG | SYSTOLIC BLOOD PRESSURE: 106 MMHG

## 2019-09-24 VITALS — DIASTOLIC BLOOD PRESSURE: 69 MMHG | SYSTOLIC BLOOD PRESSURE: 115 MMHG

## 2019-09-24 VITALS — DIASTOLIC BLOOD PRESSURE: 59 MMHG | SYSTOLIC BLOOD PRESSURE: 147 MMHG

## 2019-09-24 VITALS — SYSTOLIC BLOOD PRESSURE: 139 MMHG | DIASTOLIC BLOOD PRESSURE: 61 MMHG

## 2019-09-24 LAB
ADD MANUAL DIFF: NO
ANION GAP SERPL CALC-SCNC: 7 MMOL/L (ref 5–15)
APTT BLD: 33 SEC (ref 23–33)
BASOPHILS NFR BLD AUTO: 0.6 % (ref 0–2)
BUN SERPL-MCNC: 9 MG/DL (ref 7–18)
CALCIUM SERPL-MCNC: 8.4 MG/DL (ref 8.5–10.1)
CHLORIDE SERPL-SCNC: 100 MMOL/L (ref 98–107)
CO2 SERPL-SCNC: 28 MMOL/L (ref 21–32)
CREAT SERPL-MCNC: 0.5 MG/DL (ref 0.55–1.3)
EOSINOPHIL NFR BLD AUTO: 0.3 % (ref 0–3)
ERYTHROCYTE [DISTWIDTH] IN BLOOD BY AUTOMATED COUNT: 12.5 % (ref 11.6–14.8)
HCT VFR BLD CALC: 38.2 % (ref 37–47)
HGB BLD-MCNC: 12.8 G/DL (ref 12–16)
INR PPP: 1.1 (ref 0.9–1.1)
LYMPHOCYTES NFR BLD AUTO: 16.9 % (ref 20–45)
MCV RBC AUTO: 90 FL (ref 80–99)
MONOCYTES NFR BLD AUTO: 11.4 % (ref 1–10)
NEUTROPHILS NFR BLD AUTO: 70.8 % (ref 45–75)
PLATELET # BLD: 182 K/UL (ref 150–450)
POTASSIUM SERPL-SCNC: 3.2 MMOL/L (ref 3.5–5.1)
RBC # BLD AUTO: 4.22 M/UL (ref 4.2–5.4)
SODIUM SERPL-SCNC: 135 MMOL/L (ref 136–145)
WBC # BLD AUTO: 6.8 K/UL (ref 4.8–10.8)

## 2019-09-24 RX ADMIN — INSULIN ASPART SCH UNITS: 100 INJECTION, SOLUTION INTRAVENOUS; SUBCUTANEOUS at 22:44

## 2019-09-24 RX ADMIN — VANCOMYCIN HYDROCHLORIDE SCH MG: 500 INJECTION, POWDER, LYOPHILIZED, FOR SOLUTION INTRAVENOUS at 17:16

## 2019-09-24 RX ADMIN — DRONABINOL SCH MG: 2.5 CAPSULE ORAL at 13:00

## 2019-09-24 RX ADMIN — INSULIN ASPART SCH UNITS: 100 INJECTION, SOLUTION INTRAVENOUS; SUBCUTANEOUS at 16:30

## 2019-09-24 RX ADMIN — HEPARIN SODIUM SCH UNITS: 5000 INJECTION INTRAVENOUS; SUBCUTANEOUS at 20:57

## 2019-09-24 RX ADMIN — HEPARIN SODIUM SCH UNITS: 5000 INJECTION INTRAVENOUS; SUBCUTANEOUS at 08:43

## 2019-09-24 RX ADMIN — DRONABINOL SCH MG: 2.5 CAPSULE ORAL at 17:13

## 2019-09-24 RX ADMIN — VANCOMYCIN HYDROCHLORIDE SCH MG: 500 INJECTION, POWDER, LYOPHILIZED, FOR SOLUTION INTRAVENOUS at 08:40

## 2019-09-24 RX ADMIN — LOSARTAN POTASSIUM SCH MG: 25 TABLET, FILM COATED ORAL at 09:00

## 2019-09-24 RX ADMIN — DRONABINOL SCH MG: 2.5 CAPSULE ORAL at 08:43

## 2019-09-24 RX ADMIN — INSULIN ASPART SCH UNITS: 100 INJECTION, SOLUTION INTRAVENOUS; SUBCUTANEOUS at 05:47

## 2019-09-24 RX ADMIN — VANCOMYCIN HYDROCHLORIDE SCH MG: 500 INJECTION, POWDER, LYOPHILIZED, FOR SOLUTION INTRAVENOUS at 13:34

## 2019-09-24 RX ADMIN — VANCOMYCIN HYDROCHLORIDE SCH MG: 500 INJECTION, POWDER, LYOPHILIZED, FOR SOLUTION INTRAVENOUS at 20:54

## 2019-09-24 RX ADMIN — INSULIN ASPART SCH UNITS: 100 INJECTION, SOLUTION INTRAVENOUS; SUBCUTANEOUS at 11:30

## 2019-09-24 RX ADMIN — DEXTROSE AND SODIUM CHLORIDE SCH MLS/HR: 5; .45 INJECTION, SOLUTION INTRAVENOUS at 04:43

## 2019-09-24 NOTE — NUR
NURSE NOTES:

Patient's daughter is at bedside. Daughter reports she does not want Head CT. Also daughter 
reports that she has too consult with family regarding decision for PEG.

## 2019-09-24 NOTE — GI PROGRESS NOTE
Assessment/Plan


Problems:  


(1) Weakness


ICD Codes:  R53.1 - Weakness


SNOMED:  13781600


(2) Malnutrition


ICD Codes:  E46 - Unspecified protein-calorie malnutrition


SNOMED:  67858459


(3) Failure to thrive


SNOMED:  23980431


Qualifiers:  


   Qualified Codes:  R62.7 - Adult failure to thrive


Status:  unchanged


Status Narrative


Discussed with Dr. Garg.


Assessment/Plan


Daughter awaiting decision of other family members for PEG


we will tentatively place patient on schedule for tomorrow.


- NPO, MN.


- hold all blood thinners


Calorie count reviewed nutritional needs are not met


okay for oral gratification


Push p.o.


Strict aspiration precautions


One-to-one feeder


cont Marinol


Follow labs





The patient was seen and examined at bedside and all new and available data was 

reviewed in the patients chart. I agree with the above findings, impression 

and plan.  (Patient seen earlier today. Signature stamp does not reflect 

patient encounter time.). - Shai Garg MD





Subjective


Subjective


Limited





Objective





Last 24 Hour Vital Signs








  Date Time  Temp Pulse Resp B/P (MAP) Pulse Ox O2 Delivery O2 Flow Rate FiO2


 


9/24/19 08:15      Room Air  


 


9/24/19 08:00 98.0 58 18 147/59 (88) 95   


 


9/24/19 04:00 97.8 62 16 106/51 (69) 98   


 


9/24/19 00:00 98.2 66 16 101/52 (68) 99   


 


9/23/19 21:00      Room Air  


 


9/23/19 20:00 98.0 65 16 114/56 (75) 100   


 


9/23/19 16:00 97.9 78 20 122/61 (81) 99   

















Intake and Output  


 


 9/23/19 9/24/19





 19:00 07:00


 


Intake Total 1000 ml 450 ml


 


Output Total 800 ml 550 ml


 


Balance 200 ml -100 ml


 


  


 


IV Total 600 ml 450 ml


 


Other 400 ml 


 


Output Urine Total 800 ml 550 ml











Laboratory Tests








Test


  9/24/19


06:00


 


White Blood Count


  6.8 K/UL


(4.8-10.8)


 


Red Blood Count


  4.22 M/UL


(4.20-5.40)


 


Hemoglobin


  12.8 G/DL


(12.0-16.0)


 


Hematocrit


  38.2 %


(37.0-47.0)


 


Mean Corpuscular Volume 90 FL (80-99)  


 


Mean Corpuscular Hemoglobin


  30.2 PG


(27.0-31.0)


 


Mean Corpuscular Hemoglobin


Concent 33.5 G/DL


(32.0-36.0)


 


Red Cell Distribution Width


  12.5 %


(11.6-14.8)


 


Platelet Count


  182 K/UL


(150-450)


 


Mean Platelet Volume


  7.6 FL


(6.5-10.1)


 


Neutrophils (%) (Auto)


  70.8 %


(45.0-75.0)


 


Lymphocytes (%) (Auto)


  16.9 %


(20.0-45.0)  L


 


Monocytes (%) (Auto)


  11.4 %


(1.0-10.0)  H


 


Eosinophils (%) (Auto)


  0.3 %


(0.0-3.0)


 


Basophils (%) (Auto)


  0.6 %


(0.0-2.0)


 


Prothrombin Time


  11.4 SEC


(9.30-11.50)


 


Prothromb Time International


Ratio 1.1 (0.9-1.1)  


 


 


Activated Partial


Thromboplast Time 33 SEC (23-33)


 


 


Sodium Level


  135 MMOL/L


(136-145)  L


 


Potassium Level


  3.2 MMOL/L


(3.5-5.1)  L


 


Chloride Level


  100 MMOL/L


()


 


Carbon Dioxide Level


  28 MMOL/L


(21-32)


 


Anion Gap


  7 mmol/L


(5-15)


 


Blood Urea Nitrogen


  9 mg/dL (7-18)


 


 


Creatinine


  0.5 MG/DL


(0.55-1.30)  L


 


Estimat Glomerular Filtration


Rate  mL/min (>60)  


 


 


Glucose Level


  124 MG/DL


()  H


 


Calcium Level


  8.4 MG/DL


(8.5-10.1)  L








Height (Feet):  5


Height (Inches):  1.00


Weight (Pounds):  96


General Appearance:  no apparent distress, thin


Cardiovascular:  normal rate


Respiratory/Chest:  normal breath sounds, no respiratory distress


Abdominal Exam:  normal bowel sounds, non tender, soft


Extremities:  non-tender


Objective


Reported by the RN the patient tolerated approximately 25% of her meals











Malachi Mcqueen NP Sep 24, 2019 13:07

## 2019-09-24 NOTE — NUR
NURSE NOTES:

RECEIVED PT FROM JESUS LOCKETT. PT IS ASLEEP, AAO X1, ON ROOM AIR, NO ACUTE DISTRESS NOTED. 
DAUGHTER AT BEDSIDE. IV ON L HAND 24G IS INTACT AND PATENT. SACRAL WOUND DRESSING IS INTACT 
AND DRY. FOLLOWED UP ON EGD AND POSSIBLE PEG PLACEMENT DECISION, DAUGHTER STATED THAT SHE IS 
STILL WAITING TO CONSULT WITH FAMILY MEMBERS. BED IS LOCKED AT THE LOWEST POSITION, BED 
ALARMS ACTIVE, SIDE RAILS UP X2, AND CALL LIGHT IS WITHIN REACH. WILL CONTINUE TO MONITOR 
AND CONTINUE THE PLAN OF CARE.

## 2019-09-24 NOTE — PULMONOLOGY PROGRESS NOTE
Assessment/Plan


Problems:  


(1) Failure to thrive


(2) At high risk for aspiration


(3) Diabetes


(4) Protein-calorie malnutrition, severe


(5) Limited mobility in bed


(6) Flexion contractures


Assessment/Plan


very poor appetite


aspiration precaution


symptomatic treatment


sliding scale


diabetic diet


check electrolytes


titrate fio2 to sat of 92%\


 


discussed with pts daughter. she is out of town, family considering PEG 

placement


awaiting for consent for PEG





Subjective


ROS Limited/Unobtainable:  No


Allergies:  


Coded Allergies:  


     LISINOPRIL (Verified  Allergy, Unknown, 9/14/19)





Objective





Last 24 Hour Vital Signs








  Date Time  Temp Pulse Resp B/P (MAP) Pulse Ox O2 Delivery O2 Flow Rate FiO2


 


9/24/19 08:15      Room Air  


 


9/24/19 08:00 98.0 58 18 147/59 (88) 95   


 


9/24/19 04:00 97.8 62 16 106/51 (69) 98   


 


9/24/19 00:00 98.2 66 16 101/52 (68) 99   


 


9/23/19 21:00      Room Air  


 


9/23/19 20:00 98.0 65 16 114/56 (75) 100   


 


9/23/19 16:00 97.9 78 20 122/61 (81) 99   


 


9/23/19 12:00 97.9 67 18 108/45 (66) 100   

















Intake and Output  


 


 9/23/19 9/24/19





 19:00 07:00


 


Intake Total 1000 ml 450 ml


 


Output Total 800 ml 550 ml


 


Balance 200 ml -100 ml


 


  


 


IV Total 600 ml 450 ml


 


Other 400 ml 


 


Output Urine Total 800 ml 550 ml








Objective


General Appearance:  cachectic 


HEENT:  normocephalic, atraumatic


Respiratory/Chest:  chest wall non-tender, lungs clear


Breasts:  no masses


Cardiovascular:  normal peripheral pulses


Abdomen:  normal bowel sounds, soft, non tender


Genitourinary:  normal external genitalia


Extremities:  no clubbing


Neurologic/Psychiatric:  CNs II-XII grossly normal


Lymphatic:  no neck adenopathy


Laboratory Tests


9/24/19 06:00: 


White Blood Count 6.8, Red Blood Count 4.22, Hemoglobin 12.8, Hematocrit 38.2, 

Mean Corpuscular Volume 90, Mean Corpuscular Hemoglobin 30.2, Mean Corpuscular 

Hemoglobin Concent 33.5, Red Cell Distribution Width 12.5, Platelet Count 182, 

Mean Platelet Volume 7.6, Neutrophils (%) (Auto) 70.8, Lymphocytes (%) (Auto) 

16.9L, Monocytes (%) (Auto) 11.4H, Eosinophils (%) (Auto) 0.3, Basophils (%) (

Auto) 0.6, Prothrombin Time 11.4, Prothromb Time International Ratio 1.1, 

Activated Partial Thromboplast Time 33, Sodium Level 135L, Potassium Level 3.2L

, Chloride Level 100, Carbon Dioxide Level 28, Anion Gap 7, Blood Urea Nitrogen 

9, Creatinine 0.5L, Estimat Glomerular Filtration Rate , Glucose Level 124H, 

Calcium Level 8.4L





Current Medications








 Medications


  (Trade)  Dose


 Ordered  Sig/Trent


 Route


 PRN Reason  Start Time


 Stop Time Status Last Admin


Dose Admin


 


 Acetaminophen


  (Tylenol)  650 mg  Q4H  PRN


 ORAL


 fever  9/15/19 08:30


 10/15/19 08:29   


 


 


 Acetaminophen


  (Tylenol)  650 mg  Q4H  PRN


 ORAL


 Mild Pain (Pain Scale 1-3)  9/24/19 10:30


 9/24/19 18:00   


 


 


 Al Hydroxide/Mg


 Hydroxide


  (Mylanta)  15 ml  Q1H  PRN


 ORAL


 gi upset  9/24/19 10:30


 9/24/19 18:00   


 


 


 Atropine Sulfate


  (Atropine)  0.5 mg  Q5M  PRN


 IV


 bpm less than 45  9/24/19 10:30


 9/24/19 18:00   


 


 


 Clonidine HCl


  (Catapres Tab)  0.1 mg  Q6H  PRN


 ORAL


 sbp above 160  9/15/19 11:15


 10/15/19 11:14  9/23/19 09:58


 


 


 Dextrose


  (Dextrose 50%)  25 ml  Q30M  PRN


 IV


 Hypoglycemia  9/15/19 08:30


 10/15/19 08:29   


 


 


 Dextrose


  (Dextrose 50%)  50 ml  Q30M  PRN


 IV


 Hypoglycemia  9/15/19 08:30


 10/15/19 08:29   


 


 


 Dextrose/Sodium


 Chloride  1,000 ml @ 


 50 mls/hr  Q20H


 IV


   9/21/19 16:05


 10/21/19 16:04  9/24/19 04:43


 


 


 Diphenhydramine


 HCl


  (Benadryl)  25 mg  Q15M  PRN


 IVP


 Itching  9/24/19 10:30


 9/24/19 18:00   


 


 


 Dronabinol


  (Marinol)  2.5 mg  TID


 ORAL


   9/17/19 13:00


 10/17/19 12:59  9/23/19 18:00


 


 


 Fentanyl Citrate


  (Sublimaze 100


 mcg/2 mL)  25 mcg  Q10M  PRN


 IV


 Moderate Pain (Pain Scale 4-6)  9/24/19 10:30


 9/24/19 18:00   


 


 


 Heparin Sodium


  (Porcine)


  (Heparin 5000


 units/ml)  5,000 units  EVERY 12  HOURS


 SUBQ


   9/15/19 09:00


 10/15/19 08:59  9/23/19 21:43


 


 


 Hydralazine HCl


  (Apresoline)  5 mg  Q30M  PRN


 IV


 SBP>160 OR___/DBP>90 OR___  9/24/19 10:30


 9/24/19 18:00   


 


 


 Insulin Aspart


  (NovoLOG)    BEFORE MEALS AND  HS


 SUBQ


   9/15/19 11:30


 10/15/19 11:29  9/23/19 13:59


 


 


 Losartan Potassium


  (Cozaar)  25 mg  DAILY


 ORAL


   9/15/19 12:00


 10/15/19 11:59  9/23/19 09:58


 


 


 Midazolam HCl


  (Versed 2mg/2ml


 vial)  1 mg  Q15M  PRN


 IVP


 For Anxiety  9/24/19 10:30


 9/24/19 18:00   


 


 


 Ondansetron HCl


  (Zofran)  4 mg  Q1H  PRN


 IVP


 Nausea & Vomiting  9/24/19 10:30


 9/24/19 18:00   


 


 


 Ondansetron HCl


  (Zofran)  4 mg  Q6H  PRN


 IVP


 Nausea & Vomiting  9/15/19 08:30


 10/15/19 08:29   


 


 


 Polyethylene


 Glycol


  (Miralax)  17 gm  HSPRN  PRN


 ORAL


 Constipation  9/15/19 08:30


 10/15/19 08:29   


 


 


 Potassium Chloride  100 ml @ 


 100 mls/hr  Q1HR


 IVPB


   9/24/19 12:00


 9/24/19 13:59   


 


 


 Sodium Chloride  1,000 ml @ 


 10 mls/hr  Q24H


 IVLG


   9/24/19 10:30


 9/24/19 18:00   


 


 


 Vancomycin HCl


  (Firvanq)  125 mg  FOUR TIMES A  DAY


 ORAL


   9/17/19 13:00


 9/26/19 23:59  9/24/19 08:40


 

















Chau Fitzgerald MD Sep 24, 2019 11:27

## 2019-09-24 NOTE — GENERAL PROGRESS NOTE
Assessment/Plan


Problem List:  


(1) Diabetes


ICD Codes:  E11.9 - Type 2 diabetes mellitus without complications


SNOMED:  65913615


(2) Malnutrition


ICD Codes:  E46 - Unspecified protein-calorie malnutrition


SNOMED:  17485637


(3) Failure to thrive


SNOMED:  59535072


Qualifiers:  


   Qualified Codes:  R62.7 - Adult failure to thrive


(4) Weakness


ICD Codes:  R53.1 - Weakness


SNOMED:  28714344


(5) UTI (urinary tract infection)


ICD Codes:  N39.0 - Urinary tract infection, site not specified


SNOMED:  85463981


Qualifiers:  


   Qualified Codes:  N39.0 - Urinary tract infection, site not specified


Status:  stable, progressing, unchanged


Assessment/Plan:


pt diet abx neuro eval merman  cbc bmp am pending gtube





Subjective


Constitutional:  Reports: weakness


Allergies:  


Coded Allergies:  


     LISINOPRIL (Verified  Allergy, Unknown, 9/14/19)


All Systems:  reviewed and negative except above


Subjective


calm sleepy  in bed





Objective





Last 24 Hour Vital Signs








  Date Time  Temp Pulse Resp B/P (MAP) Pulse Ox O2 Delivery O2 Flow Rate FiO2


 


9/24/19 08:15      Room Air  


 


9/24/19 08:00 98.0 58 18 147/59 (88) 95   


 


9/24/19 04:00 97.8 62 16 106/51 (69) 98   


 


9/24/19 00:00 98.2 66 16 101/52 (68) 99   


 


9/23/19 21:00      Room Air  


 


9/23/19 20:00 98.0 65 16 114/56 (75) 100   


 


9/23/19 16:00 97.9 78 20 122/61 (81) 99   

















Intake and Output  


 


 9/23/19 9/24/19





 19:00 07:00


 


Intake Total 1000 ml 450 ml


 


Output Total 800 ml 550 ml


 


Balance 200 ml -100 ml


 


  


 


IV Total 600 ml 450 ml


 


Other 400 ml 


 


Output Urine Total 800 ml 550 ml








Laboratory Tests


9/24/19 06:00: 


White Blood Count 6.8, Red Blood Count 4.22, Hemoglobin 12.8, Hematocrit 38.2, 

Mean Corpuscular Volume 90, Mean Corpuscular Hemoglobin 30.2, Mean Corpuscular 

Hemoglobin Concent 33.5, Red Cell Distribution Width 12.5, Platelet Count 182, 

Mean Platelet Volume 7.6, Neutrophils (%) (Auto) 70.8, Lymphocytes (%) (Auto) 

16.9L, Monocytes (%) (Auto) 11.4H, Eosinophils (%) (Auto) 0.3, Basophils (%) (

Auto) 0.6, Prothrombin Time 11.4, Prothromb Time International Ratio 1.1, 

Activated Partial Thromboplast Time 33, Sodium Level 135L, Potassium Level 3.2L

, Chloride Level 100, Carbon Dioxide Level 28, Anion Gap 7, Blood Urea Nitrogen 

9, Creatinine 0.5L, Estimat Glomerular Filtration Rate , Glucose Level 124H, 

Calcium Level 8.4L


Height (Feet):  5


Height (Inches):  1.00


Weight (Pounds):  96


General Appearance:  lethargic


EENT:  normal ENT inspection


Neck:  normal alignment


Cardiovascular:  normal peripheral pulses, normal rate, regular rhythm


Respiratory/Chest:  chest wall non-tender, lungs clear, normal breath sounds


Abdomen:  normal bowel sounds, non tender, soft


Extremities:  normal inspection


Edema:  no edema noted Arm (L), no edema noted Arm (R), no edema noted Leg (L), 

no edema noted Leg (R), no edema noted Pedal (L), no edema noted Pedal (R), no 

edema noted Generalized


Neurologic:  motor weakness


Skin:  normal pigmentation, warm/dry











Chino Pineda DO Sep 24, 2019 13:10

## 2019-09-24 NOTE — NUR
NURSE NOTES:

Patient is asleep. IVF is infusing. No s/s of discomfort. Patient has perkins catheter 
draining to gravity. Perkins is anchored. Bed is locked, in lowest position, and call light is 
within reach. Will continue to monitor.

## 2019-09-24 NOTE — ANETHESIA PREOPERATIVE EVAL
Anesthesia Pre-op PMH/ROS


General


Date of Evaluation:  Sep 24, 2019


Time of Evaluation:  10:31


Anesthesiologist:  gudelia


ASA Score:  ASA 4


Mallampati Score


Class I : Soft palate, uvula, fauces, pillars visible


Class II: Soft palate, uvula, fauces visible


Class III: Soft palate, base of uvula visible


Class IV: Only hard plate visible


Surgeon:  je


Diagnosis:  dysphagia, failure to thrive


Surgical Procedure:  peg placement


Anesthesia History:  none


Social History:  smoking - nonsmoker


Family History:  no anesthesia problems


Allergies:  


Coded Allergies:  


     LISINOPRIL (Verified  Allergy, Unknown, 9/14/19)


Medications:  see eMAR


Patient NPO?:  Yes





Past Medical History


Cardiovascular:  Reports: HTN


Pulmonary:  Reports: asthma, other - bronchitis


Gastrointestinal/Genitourinary:  Reports: other - dysphagia, uti


Neurologic/Psychiatric:  Reports: other - failure to thrive, muscle weakness


Endocrine:  Reports: DM


Musculoskeletal/Integumentary:  Reports: other - bilateral lower extremity 

contracture





Anesthesia Pre-op Phys. Exam


Physician Exam





Last Vital Signs








  Date Time  Temp Pulse Resp B/P (MAP) Pulse Ox O2 Delivery O2 Flow Rate FiO2


 


9/24/19 08:15      Room Air  


 


9/24/19 04:00 97.8 62 16 106/51 (69) 98   











Anesthesia Pre-op A/P


Labs





Hematology








Test


  9/24/19


06:00


 


White Blood Count


  6.8 K/UL


(4.8-10.8)


 


Red Blood Count


  4.22 M/UL


(4.20-5.40)


 


Hemoglobin


  12.8 G/DL


(12.0-16.0)


 


Hematocrit


  38.2 %


(37.0-47.0)


 


Mean Corpuscular Volume 90 FL (80-99)  


 


Mean Corpuscular Hemoglobin


  30.2 PG


(27.0-31.0)


 


Mean Corpuscular Hemoglobin


Concent 33.5 G/DL


(32.0-36.0)


 


Red Cell Distribution Width


  12.5 %


(11.6-14.8)


 


Platelet Count


  182 K/UL


(150-450)


 


Mean Platelet Volume


  7.6 FL


(6.5-10.1)


 


Neutrophils (%) (Auto)


  70.8 %


(45.0-75.0)


 


Lymphocytes (%) (Auto)


  16.9 %


(20.0-45.0)  L


 


Monocytes (%) (Auto)


  11.4 %


(1.0-10.0)  H


 


Eosinophils (%) (Auto)


  0.3 %


(0.0-3.0)


 


Basophils (%) (Auto)


  0.6 %


(0.0-2.0)








Coagulation








Test


  9/24/19


06:00


 


Prothrombin Time


  11.4 SEC


(9.30-11.50)


 


Prothromb Time International


Ratio 1.1 (0.9-1.1)  


 


 


Activated Partial


Thromboplast Time 33 SEC (23-33)


 








Chemistry








Test


  9/24/19


06:00


 


Sodium Level


  135 MMOL/L


(136-145)  L


 


Potassium Level


  3.2 MMOL/L


(3.5-5.1)  L


 


Chloride Level


  100 MMOL/L


()


 


Carbon Dioxide Level


  28 MMOL/L


(21-32)


 


Anion Gap


  7 mmol/L


(5-15)


 


Blood Urea Nitrogen


  9 mg/dL (7-18)


 


 


Creatinine


  0.5 MG/DL


(0.55-1.30)  L


 


Estimat Glomerular Filtration


Rate  mL/min (>60)  


 


 


Glucose Level


  124 MG/DL


()  H


 


Calcium Level


  8.4 MG/DL


(8.5-10.1)  L











Risk Assessment & Plan


Assessment:


asa4


Plan:


Nini Roberts MD Sep 24, 2019 10:34

## 2019-09-24 NOTE — NUR
RD ASSESSMENT & RECOMMENDATIONS

SEE CARE ACTIVITY FOR COMPLETE ASSESSMENT



DAILY ESTIMATED NEEDS:

Needs based on FTT, 46kg  

30-35  kcals/kg 

1340-9897  total kcals

1-1.5  g protein/kg

46-69  g total protein 

25-30  mL/kg

3376-9689  total fluid mLs



NUTRITION DIAGNOSIS:

Increased kcal and pro needs r/t wound healing as evidenced by pt w/

coccyx pressure ulcer, w/ generalized moderate to severe wasting, w/ poor

PO intake, now pending PEG placement.





CURRENT DIET: Regular liquify puree w/ NTL     



 

PO DIET RECOMMENDATIONS:

ORAL GRAT -> Liberalized Regular diet / texture per SLP  







ENTERAL NUTRITION RECOMMENDATIONS:

Glucerna 1.2 @ 50ml/hr x 24 hrs  to provide 1200ml, 1440kcal, 72g prot, 966ml free water 



- Post PEG placement, initiate Glucerna 1.2 @ 20ml/hr x 6 hrs

- Advance as tolerated 10ml/hr q4-6 hrs to goal rate.

- Flush per MD, HOB over 30 degrees



 



ADDITIONAL RECOMMENDATIONS:

1) Obtain calibrated bed scale wts, weekly wt monitoring 

2) W/ TF, monitor lytes daily, replete as needed 

      -> HIGH RISK FOR REFEEDING SYNDROME 

3) Add probiotics: Stool C-diff + 

. 
## 2019-09-25 VITALS — SYSTOLIC BLOOD PRESSURE: 122 MMHG | DIASTOLIC BLOOD PRESSURE: 77 MMHG

## 2019-09-25 VITALS — SYSTOLIC BLOOD PRESSURE: 162 MMHG | DIASTOLIC BLOOD PRESSURE: 65 MMHG

## 2019-09-25 VITALS — SYSTOLIC BLOOD PRESSURE: 99 MMHG | DIASTOLIC BLOOD PRESSURE: 58 MMHG

## 2019-09-25 VITALS — DIASTOLIC BLOOD PRESSURE: 60 MMHG | SYSTOLIC BLOOD PRESSURE: 110 MMHG

## 2019-09-25 VITALS — SYSTOLIC BLOOD PRESSURE: 122 MMHG | DIASTOLIC BLOOD PRESSURE: 68 MMHG

## 2019-09-25 VITALS — SYSTOLIC BLOOD PRESSURE: 131 MMHG | DIASTOLIC BLOOD PRESSURE: 86 MMHG

## 2019-09-25 VITALS — SYSTOLIC BLOOD PRESSURE: 150 MMHG | DIASTOLIC BLOOD PRESSURE: 68 MMHG

## 2019-09-25 VITALS — SYSTOLIC BLOOD PRESSURE: 159 MMHG | DIASTOLIC BLOOD PRESSURE: 72 MMHG

## 2019-09-25 VITALS — DIASTOLIC BLOOD PRESSURE: 64 MMHG | SYSTOLIC BLOOD PRESSURE: 149 MMHG

## 2019-09-25 VITALS — DIASTOLIC BLOOD PRESSURE: 61 MMHG | SYSTOLIC BLOOD PRESSURE: 138 MMHG

## 2019-09-25 VITALS — SYSTOLIC BLOOD PRESSURE: 163 MMHG | DIASTOLIC BLOOD PRESSURE: 64 MMHG

## 2019-09-25 LAB
ADD MANUAL DIFF: NO
ANION GAP SERPL CALC-SCNC: 5 MMOL/L (ref 5–15)
APTT BLD: 32 SEC (ref 23–33)
BASOPHILS NFR BLD AUTO: 1 % (ref 0–2)
BUN SERPL-MCNC: 7 MG/DL (ref 7–18)
CALCIUM SERPL-MCNC: 8.1 MG/DL (ref 8.5–10.1)
CHLORIDE SERPL-SCNC: 102 MMOL/L (ref 98–107)
CO2 SERPL-SCNC: 28 MMOL/L (ref 21–32)
CREAT SERPL-MCNC: 0.4 MG/DL (ref 0.55–1.3)
EOSINOPHIL NFR BLD AUTO: 0.5 % (ref 0–3)
ERYTHROCYTE [DISTWIDTH] IN BLOOD BY AUTOMATED COUNT: 12.3 % (ref 11.6–14.8)
HCT VFR BLD CALC: 33.9 % (ref 37–47)
HGB BLD-MCNC: 11.3 G/DL (ref 12–16)
INR PPP: 1.1 (ref 0.9–1.1)
LYMPHOCYTES NFR BLD AUTO: 17.8 % (ref 20–45)
MCV RBC AUTO: 91 FL (ref 80–99)
MONOCYTES NFR BLD AUTO: 11.1 % (ref 1–10)
NEUTROPHILS NFR BLD AUTO: 69.6 % (ref 45–75)
PLATELET # BLD: 174 K/UL (ref 150–450)
POTASSIUM SERPL-SCNC: 3.5 MMOL/L (ref 3.5–5.1)
RBC # BLD AUTO: 3.71 M/UL (ref 4.2–5.4)
SODIUM SERPL-SCNC: 135 MMOL/L (ref 136–145)
WBC # BLD AUTO: 6.1 K/UL (ref 4.8–10.8)

## 2019-09-25 PROCEDURE — 0DH63UZ INSERTION OF FEEDING DEVICE INTO STOMACH, PERCUTANEOUS APPROACH: ICD-10-PCS

## 2019-09-25 RX ADMIN — DRONABINOL SCH MG: 2.5 CAPSULE ORAL at 14:00

## 2019-09-25 RX ADMIN — INSULIN ASPART SCH UNITS: 100 INJECTION, SOLUTION INTRAVENOUS; SUBCUTANEOUS at 22:12

## 2019-09-25 RX ADMIN — VANCOMYCIN HYDROCHLORIDE SCH MG: 500 INJECTION, POWDER, LYOPHILIZED, FOR SOLUTION INTRAVENOUS at 13:59

## 2019-09-25 RX ADMIN — INSULIN ASPART SCH UNITS: 100 INJECTION, SOLUTION INTRAVENOUS; SUBCUTANEOUS at 11:30

## 2019-09-25 RX ADMIN — HEPARIN SODIUM SCH UNITS: 5000 INJECTION INTRAVENOUS; SUBCUTANEOUS at 09:00

## 2019-09-25 RX ADMIN — HEPARIN SODIUM SCH UNITS: 5000 INJECTION INTRAVENOUS; SUBCUTANEOUS at 22:11

## 2019-09-25 RX ADMIN — VANCOMYCIN HYDROCHLORIDE SCH MG: 500 INJECTION, POWDER, LYOPHILIZED, FOR SOLUTION INTRAVENOUS at 17:08

## 2019-09-25 RX ADMIN — VANCOMYCIN HYDROCHLORIDE SCH MG: 500 INJECTION, POWDER, LYOPHILIZED, FOR SOLUTION INTRAVENOUS at 22:09

## 2019-09-25 RX ADMIN — LOSARTAN POTASSIUM SCH MG: 25 TABLET, FILM COATED ORAL at 09:00

## 2019-09-25 RX ADMIN — DRONABINOL SCH MG: 2.5 CAPSULE ORAL at 17:08

## 2019-09-25 RX ADMIN — DEXTROSE AND SODIUM CHLORIDE SCH MLS/HR: 5; .45 INJECTION, SOLUTION INTRAVENOUS at 22:16

## 2019-09-25 RX ADMIN — DEXTROSE AND SODIUM CHLORIDE SCH MLS/HR: 5; .45 INJECTION, SOLUTION INTRAVENOUS at 01:09

## 2019-09-25 RX ADMIN — VANCOMYCIN HYDROCHLORIDE SCH MG: 500 INJECTION, POWDER, LYOPHILIZED, FOR SOLUTION INTRAVENOUS at 09:00

## 2019-09-25 RX ADMIN — DRONABINOL SCH MG: 2.5 CAPSULE ORAL at 09:00

## 2019-09-25 RX ADMIN — INSULIN ASPART SCH UNITS: 100 INJECTION, SOLUTION INTRAVENOUS; SUBCUTANEOUS at 06:30

## 2019-09-25 RX ADMIN — INSULIN ASPART SCH UNITS: 100 INJECTION, SOLUTION INTRAVENOUS; SUBCUTANEOUS at 17:12

## 2019-09-25 NOTE — NUR
NURSE NOTES:

Received report from Thu, RN. Pt in bed, nonverbal, tracks with eyes, restless, moans, 
withdraws from pain, RN will reviewed medications and administer pain medication as ordered, 
pt position turned, [place] secured to leg, no apparent respiratory distress noted, bed in 
lowest position, call light within reach.

## 2019-09-25 NOTE — 48 HOUR POST ANESTHESIA EVAL
Post Anesthesia Evaluation


Procedure:  EGD PEG tube placement


Date of Evaluation:  Sep 25, 2019


Time of Evaluation:  12:16


Blood Pressure Systolic:  142


0:  64


Pulse Rate:  58


Respiratory Rate:  18


Temperature (Fahrenheit):  97.8


O2 Sat by Pulse Oximetry:  98


Airway:  patent


Nausea:  No


Vomiting:  No


Pain Intensity:  1


Hydration Status:  adequate


Cardiopulmonary Status:


stable


Mental Status/LOC:  patient returned to baseline


Follow-up Care/Observations:


n/a


Post-Anesthesia Complications:


none


Follow-up care needed:  ready to discharge











Reji Ivory MD Sep 25, 2019 12:18

## 2019-09-25 NOTE — GENERAL PROGRESS NOTE
Assessment/Plan


Problem List:  


(1) Protein-calorie malnutrition, severe


ICD Codes:  E43 - Unspecified severe protein-calorie malnutrition


SNOMED:  140047979, 275633954, 376580207


(2) Weakness


ICD Codes:  R53.1 - Weakness


SNOMED:  98363590


(3) Malnutrition


ICD Codes:  E46 - Unspecified protein-calorie malnutrition


SNOMED:  65031457


(4) Failure to thrive


SNOMED:  98989469


Qualifiers:  


   Qualified Codes:  R62.7 - Adult failure to thrive


(5) Diabetes


ICD Codes:  E11.9 - Type 2 diabetes mellitus without complications


SNOMED:  04131164


(6) UTI (urinary tract infection)


ICD Codes:  N39.0 - Urinary tract infection, site not specified


SNOMED:  14977022


Qualifiers:  


   Qualified Codes:  N39.0 - Urinary tract infection, site not specified


Status:  stable, progressing, unchanged


Assessment/Plan:


plan PEG placement for today





Subjective


ROS Limited/Unobtainable:  No


Allergies:  


Coded Allergies:  


     LISINOPRIL (Verified  Allergy, Unknown, 9/14/19)





Objective





Last 24 Hour Vital Signs








  Date Time  Temp Pulse Resp B/P (MAP) Pulse Ox O2 Delivery O2 Flow Rate FiO2


 


9/25/19 09:00    125/76    


 


9/25/19 08:24      Room Air  


 


9/25/19 08:00 98.1 64 18 125/76 (92) 99   


 


9/25/19 04:00 99.2 63 20 138/61 (86) 95   


 


9/25/19 00:00 98.6 60 18 122/77 (92) 97   


 


9/24/19 21:00      Room Air  


 


9/24/19 20:00 98.4 61 18 139/61 (87) 96   


 


9/24/19 16:00 98.9 58 18 115/69 (84) 96   


 


9/24/19 12:00 98.0 56 18 110/63 (79) 96   

















Intake and Output  


 


 9/24/19 9/25/19





 19:00 07:00


 


Intake Total 600 ml 500 ml


 


Output Total 800 ml 400 ml


 


Balance -200 ml 100 ml


 


  


 


IV Total  500 ml


 


Other 600 ml 


 


Output Urine Total 800 ml 400 ml


 


# Voids  1


 


# Bowel Movements  2








Laboratory Tests


9/25/19 05:25: 


White Blood Count 6.1, Red Blood Count 3.71L, Hemoglobin 11.3L, Hematocrit 33.9L

, Mean Corpuscular Volume 91, Mean Corpuscular Hemoglobin 30.6, Mean 

Corpuscular Hemoglobin Concent 33.5, Red Cell Distribution Width 12.3, Platelet 

Count 174, Mean Platelet Volume 6.4L, Neutrophils (%) (Auto) 69.6, Lymphocytes (

%) (Auto) 17.8L, Monocytes (%) (Auto) 11.1H, Eosinophils (%) (Auto) 0.5, 

Basophils (%) (Auto) 1.0, Prothrombin Time 11.9H, Prothromb Time International 

Ratio 1.1, Activated Partial Thromboplast Time 32, Sodium Level 135L, Potassium 

Level 3.5, Chloride Level 102, Carbon Dioxide Level 28, Anion Gap 5, Blood Urea 

Nitrogen 7, Creatinine 0.4L, Estimat Glomerular Filtration Rate , Glucose Level 

105, Calcium Level 8.1L


Height (Feet):  5


Height (Inches):  1.00


Weight (Pounds):  95


General Appearance:  lethargic


EENT:  normal ENT inspection


Neck:  supple


Cardiovascular:  normal rate


Respiratory/Chest:  decreased breath sounds


Abdomen:  normal bowel sounds, non tender, soft


Extremities:  non-tender











Shai Garg MD Sep 25, 2019 11:50

## 2019-09-25 NOTE — GENERAL PROGRESS NOTE
Assessment/Plan


Problem List:  


(1) Diabetes


ICD Codes:  E11.9 - Type 2 diabetes mellitus without complications


SNOMED:  06259968


(2) Malnutrition


ICD Codes:  E46 - Unspecified protein-calorie malnutrition


SNOMED:  22346163


(3) Failure to thrive


SNOMED:  94738101


Qualifiers:  


   Qualified Codes:  R62.7 - Adult failure to thrive


(4) Weakness


ICD Codes:  R53.1 - Weakness


SNOMED:  34972546


(5) UTI (urinary tract infection)


ICD Codes:  N39.0 - Urinary tract infection, site not specified


SNOMED:  35872317


Qualifiers:  


   Qualified Codes:  N39.0 - Urinary tract infection, site not specified


Status:  stable, progressing, unchanged


Assessment/Plan:


pt diet abx neuro eval merman  cbc bmp am pending gtube





Subjective


Constitutional:  Reports: weakness


Allergies:  


Coded Allergies:  


     LISINOPRIL (Verified  Allergy, Unknown, 9/14/19)


All Systems:  reviewed and negative except above


Subjective


calm sleepy  in bed





Objective





Last 24 Hour Vital Signs








  Date Time  Temp Pulse Resp B/P (MAP) Pulse Ox O2 Delivery O2 Flow Rate FiO2


 


9/25/19 08:24      Room Air  


 


9/25/19 08:00 98.1 64 18 125/76 (92) 99   


 


9/25/19 04:00 99.2 63 20 138/61 (86) 95   


 


9/25/19 00:00 98.6 60 18 122/77 (92) 97   


 


9/24/19 21:00      Room Air  


 


9/24/19 20:00 98.4 61 18 139/61 (87) 96   


 


9/24/19 16:00 98.9 58 18 115/69 (84) 96   


 


9/24/19 12:00 98.0 56 18 110/63 (79) 96   

















Intake and Output  


 


 9/24/19 9/25/19





 19:00 07:00


 


Intake Total 600 ml 500 ml


 


Output Total 800 ml 400 ml


 


Balance -200 ml 100 ml


 


  


 


IV Total  500 ml


 


Other 600 ml 


 


Output Urine Total 800 ml 400 ml


 


# Voids  1


 


# Bowel Movements  2








Laboratory Tests


9/25/19 05:25: 


White Blood Count 6.1, Red Blood Count 3.71L, Hemoglobin 11.3L, Hematocrit 33.9L

, Mean Corpuscular Volume 91, Mean Corpuscular Hemoglobin 30.6, Mean 

Corpuscular Hemoglobin Concent 33.5, Red Cell Distribution Width 12.3, Platelet 

Count 174, Mean Platelet Volume 6.4L, Neutrophils (%) (Auto) 69.6, Lymphocytes (

%) (Auto) 17.8L, Monocytes (%) (Auto) 11.1H, Eosinophils (%) (Auto) 0.5, 

Basophils (%) (Auto) 1.0, Prothrombin Time 11.9H, Prothromb Time International 

Ratio 1.1, Activated Partial Thromboplast Time 32, Sodium Level 135L, Potassium 

Level 3.5, Chloride Level 102, Carbon Dioxide Level 28, Anion Gap 5, Blood Urea 

Nitrogen 7, Creatinine 0.4L, Estimat Glomerular Filtration Rate , Glucose Level 

105, Calcium Level 8.1L


Height (Feet):  5


Height (Inches):  1.00


Weight (Pounds):  95


General Appearance:  lethargic


EENT:  normal ENT inspection


Neck:  normal alignment


Cardiovascular:  normal peripheral pulses, normal rate, regular rhythm


Respiratory/Chest:  chest wall non-tender, lungs clear, normal breath sounds


Abdomen:  normal bowel sounds, non tender, soft


Extremities:  normal inspection


Edema:  no edema noted Arm (L), no edema noted Arm (R), no edema noted Leg (L), 

no edema noted Leg (R), no edema noted Pedal (L), no edema noted Pedal (R), no 

edema noted Generalized


Neurologic:  motor weakness


Skin:  normal pigmentation, warm/dry











Chino Pineda DO Sep 25, 2019 09:22

## 2019-09-25 NOTE — ANETHESIA PREOPERATIVE EVAL
Anesthesia Pre-op PMH/ROS


General


Date of Evaluation:  Sep 25, 2019


Time of Evaluation:  11:40


Anesthesiologist:  Cachorro


ASA Score:  ASA 4


Mallampati Score


Class I : Soft palate, uvula, fauces, pillars visible


Class II: Soft palate, uvula, fauces visible


Class III: Soft palate, base of uvula visible


Class IV: Only hard plate visible


Mallampati Classification:  Class III


Surgeon:  Britany


Diagnosis:  Dysphagia


Surgical Procedure:  EGD PEG


Anesthesia History:  none


Family History:  no anesthesia problems


Allergies:  


Coded Allergies:  


     LISINOPRIL (Verified  Allergy, Unknown, 9/14/19)


Medications:  see eMAR


Patient NPO?:  Yes





Past Medical History


Cardiovascular:  Reports: HTN; 


   Denies: CAD, MI, valve dz, arrhythmia, other


Pulmonary:  Denies: asthma, COPD, ABRIL, other


Gastrointestinal/Genitourinary:  Reports: GERD; 


   Denies: CRI, ESRD, other


Neurologic/Psychiatric:  Reports: dementia, CVA; 


   Denies: depression/anxiety, TIA, other


Endocrine:  Reports: hypothyroidism; 


   Denies: DM, steroids, other


HEENT:  Denies: cataract (L), cataract (R), glaucoma, [place] (L), [place] (R), other


Hematology/Immune:  Reports: anemia; 


   Denies: DVT, bleeding disorder, other


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


Other:  other - malnourished


PMH Narrative:


as above


PSxH Narrative:


see chart





Anesthesia Pre-op Phys. Exam


Physician Exam





Last Vital Signs








  Date Time  Temp Pulse Resp B/P (MAP) Pulse Ox O2 Delivery O2 Flow Rate FiO2


 


9/25/19 09:00    125/76    


 


9/25/19 08:24      Room Air  


 


9/25/19 08:00 98.1 64 18  99   








Constitutional:  NAD


Neurologic:  other - unable to obtaine


Respiratory:  CTA


Gastrointestinal:  S/NT/ND





Airway Exam


Mallampati Score:  Class III


MO:  limited


Neck:  stiff


ROM:  limited


Teeth:  missing


Dentures:  no upper, no lower





Anesthesia Pre-op A/P


Labs





Hematology








Test


  9/25/19


05:25


 


White Blood Count


  6.1 K/UL


(4.8-10.8)


 


Red Blood Count


  3.71 M/UL


(4.20-5.40)  L


 


Hemoglobin


  11.3 G/DL


(12.0-16.0)  L


 


Hematocrit


  33.9 %


(37.0-47.0)  L


 


Mean Corpuscular Volume 91 FL (80-99)  


 


Mean Corpuscular Hemoglobin


  30.6 PG


(27.0-31.0)


 


Mean Corpuscular Hemoglobin


Concent 33.5 G/DL


(32.0-36.0)


 


Red Cell Distribution Width


  12.3 %


(11.6-14.8)


 


Platelet Count


  174 K/UL


(150-450)


 


Mean Platelet Volume


  6.4 FL


(6.5-10.1)  L


 


Neutrophils (%) (Auto)


  69.6 %


(45.0-75.0)


 


Lymphocytes (%) (Auto)


  17.8 %


(20.0-45.0)  L


 


Monocytes (%) (Auto)


  11.1 %


(1.0-10.0)  H


 


Eosinophils (%) (Auto)


  0.5 %


(0.0-3.0)


 


Basophils (%) (Auto)


  1.0 %


(0.0-2.0)








Coagulation








Test


  9/25/19


05:25


 


Prothrombin Time


  11.9 SEC


(9.30-11.50)  H


 


Prothromb Time International


Ratio 1.1 (0.9-1.1)  


 


 


Activated Partial


Thromboplast Time 32 SEC (23-33)


 








Chemistry








Test


  9/25/19


05:25


 


Sodium Level


  135 MMOL/L


(136-145)  L


 


Potassium Level


  3.5 MMOL/L


(3.5-5.1)


 


Chloride Level


  102 MMOL/L


()


 


Carbon Dioxide Level


  28 MMOL/L


(21-32)


 


Anion Gap


  5 mmol/L


(5-15)


 


Blood Urea Nitrogen


  7 mg/dL (7-18)


 


 


Creatinine


  0.4 MG/DL


(0.55-1.30)  L


 


Estimat Glomerular Filtration


Rate  mL/min (>60)  


 


 


Glucose Level


  105 MG/DL


()


 


Calcium Level


  8.1 MG/DL


(8.5-10.1)  L











Risk Assessment & Plan


Assessment:


ASA 4


Plan:


MAC


Status Change Before Surgery:  Reji Spring MD Sep 25, 2019 12:07

## 2019-09-25 NOTE — ENDOSCOPY PROCEDURE NOTE
Endoscopy Procedure Note


General


Indication for Procedure:  dysphagia


Procedures Performed:  EGD, PEG


Operative Findings/Diagnosis:  same


Specimen:  none


Pt Tolerated Procedure Well:  Yes


Estimated Blood Loss:  none





Anesthesia


Anesthesiologist:  ayden


Anesthesia:  MAC





Inserted Devices


Implant(s) used?:  No





GI Core Measures


50 yrs or older w/o bx or poly:  Not Applicable


10yrs. F/U recommended:  Not Applicable











Shia Garg MD Sep 25, 2019 12:06

## 2019-09-25 NOTE — PROCEDURE NOTE
DATE OF PROCEDURE:  09/25/2019

SURGEON:  Shai Garg M.D.



REFERRING PHYSICIAN:  Chino Pineda D.O.



PROCEDURE:  Upper endoscopy with PEG placement.



ANESTHESIA:  Per Dr. Ivory.



INSTRUMENT:  Olympus adult flexible upper endoscope.



INDICATIONS:

1. Dysphagia.

2. Failure to thrive.



REASON FOR PROCEDURE:  The procedure, risks, benefits, and possible

consequences, including hemorrhage, aspiration, perforation and infection,

and alternative treatments, were explained to the patient/legal guardian

by Dr. Shai Garg and the patient/legal guardian understood and

accepted these risks.



PROCEDURE IN DETAIL:  After informed consent was obtained and the patient

was adequately sedated, Olympus upper endoscope was advanced from mouth

into the second portion of the duodenum and retroflexion was performed in

the stomach.



The patient has evidence of diffuse gastritis.  No evidence of any

active ulceration or bleeding.  Then, under endoscopic guidance and under

sterile condition, a 20-[place] pull type of G-tube was successfully placed

in the epigastric area.  The distance from the tip of the tube to skin was

about 2.5 cm in size.  The patient tolerated the procedure very well

without any complication.



SUMMARY OF FINDINGS:  Status post successful PEG placement.



RECOMMENDATIONS:

1. Abdominal binder.

2. Elevate the head of the bed at all times.

3. G-tube flush.

4. G-tube care.

5. Start tube feeding later today.

6. The patient received a dose of antibiotics today.



I want to thank Dr. Chino Pineda for this kind referral.









  ______________________________________________

  Shai Garg M.D.





DR:  HERBIE

D:  09/25/2019 12:07

T:  09/25/2019 17:07

JOB#:  [id number]

CC:

## 2019-09-25 NOTE — INFECTIOUS DISEASES PROG NOTE
Assessment/Plan


83 yo female with PMHx of DM and failure  to thrive who was admitted to the 

hospital on 9/14/19. 





C. diff Diarrhea resolving 


   Toxin assay Pos 9/16/19


  


UTI, Sp Rx 


   UA (+)


   Urine Cx 9/14/19 - E,. coli (Sen to cefazolin)








DM


Failure to thrive





Plan





- Continue PO Vancomycin # 9 /10 ( End date 9/26/19)





      - 9/20/19 SP Ceftriaxone #6








- OK to D/C from an ID perspective 





- Monitor BMs





- Monitor CBC and Temps


- PEG placemtn





Subjective


Allergies:  


Coded Allergies:  


     LISINOPRIL (Verified  Allergy, Unknown, 9/14/19)


Subjective


scheduled for  PEG





Objective


Vital Signs





Last 24 Hour Vital Signs








  Date Time  Temp Pulse Resp B/P (MAP) Pulse Ox O2 Delivery O2 Flow Rate FiO2


 


9/25/19 13:15 99.1 76 16 110/60 (77) 98   


 


9/25/19 12:45 98.2 56 22 163/64 100 Room Air  


 


9/25/19 12:35  58 15 162/65 100 Room Air  


 


9/25/19 12:25  54 15 159/72 100 Nasal Cannula 3 


 


9/25/19 12:20  55 18 150/68 100 Nasal Cannula 3 


 


9/25/19 12:18  58 18  98   


 


9/25/19 12:16 98.9 53 24 149/64 100 Nasal Cannula 3 


 


9/25/19 09:00    125/76    


 


9/25/19 08:24      Room Air  


 


9/25/19 08:00 98.1 64 18 125/76 (92) 99   


 


9/25/19 04:00 99.2 63 20 138/61 (86) 95   


 


9/25/19 00:00 98.6 60 18 122/77 (92) 97   


 


9/24/19 21:00      Room Air  


 


9/24/19 20:00 98.4 61 18 139/61 (87) 96   


 


9/24/19 16:00 98.9 58 18 115/69 (84) 96   








Height (Feet):  5


Height (Inches):  1.00


Weight (Pounds):  95


HEENT:  mucous membranes moist


Respiratory/Chest:  normal breath sounds


Cardiovascular:  normal rate


Abdomen:  soft, non tender





Laboratory Tests








Test


  9/25/19


05:25


 


White Blood Count


  6.1 K/UL


(4.8-10.8)


 


Red Blood Count


  3.71 M/UL


(4.20-5.40)  L


 


Hemoglobin


  11.3 G/DL


(12.0-16.0)  L


 


Hematocrit


  33.9 %


(37.0-47.0)  L


 


Mean Corpuscular Volume 91 FL (80-99)  


 


Mean Corpuscular Hemoglobin


  30.6 PG


(27.0-31.0)


 


Mean Corpuscular Hemoglobin


Concent 33.5 G/DL


(32.0-36.0)


 


Red Cell Distribution Width


  12.3 %


(11.6-14.8)


 


Platelet Count


  174 K/UL


(150-450)


 


Mean Platelet Volume


  6.4 FL


(6.5-10.1)  L


 


Neutrophils (%) (Auto)


  69.6 %


(45.0-75.0)


 


Lymphocytes (%) (Auto)


  17.8 %


(20.0-45.0)  L


 


Monocytes (%) (Auto)


  11.1 %


(1.0-10.0)  H


 


Eosinophils (%) (Auto)


  0.5 %


(0.0-3.0)


 


Basophils (%) (Auto)


  1.0 %


(0.0-2.0)


 


Prothrombin Time


  11.9 SEC


(9.30-11.50)  H


 


Prothromb Time International


Ratio 1.1 (0.9-1.1)  


 


 


Activated Partial


Thromboplast Time 32 SEC (23-33)


 


 


Sodium Level


  135 MMOL/L


(136-145)  L


 


Potassium Level


  3.5 MMOL/L


(3.5-5.1)


 


Chloride Level


  102 MMOL/L


()


 


Carbon Dioxide Level


  28 MMOL/L


(21-32)


 


Anion Gap


  5 mmol/L


(5-15)


 


Blood Urea Nitrogen


  7 mg/dL (7-18)


 


 


Creatinine


  0.4 MG/DL


(0.55-1.30)  L


 


Estimat Glomerular Filtration


Rate  mL/min (>60)  


 


 


Glucose Level


  105 MG/DL


()


 


Calcium Level


  8.1 MG/DL


(8.5-10.1)  L











Current Medications








 Medications


  (Trade)  Dose


 Ordered  Sig/Trent


 Route


 PRN Reason  Start Time


 Stop Time Status Last Admin


Dose Admin


 


 Acetaminophen


  (Tylenol)  650 mg  Q4H  PRN


 ORAL


 fever  9/15/19 08:30


 10/15/19 08:29   


 


 


 Clonidine HCl


  (Catapres Tab)  0.1 mg  Q6H  PRN


 ORAL


 sbp above 160  9/15/19 11:15


 10/15/19 11:14  9/23/19 09:58


 


 


 Dextrose


  (Dextrose 50%)  25 ml  Q30M  PRN


 IV


 Hypoglycemia  9/15/19 08:30


 10/15/19 08:29   


 


 


 Dextrose


  (Dextrose 50%)  50 ml  Q30M  PRN


 IV


 Hypoglycemia  9/15/19 08:30


 10/15/19 08:29   


 


 


 Dextrose/Sodium


 Chloride  1,000 ml @ 


 50 mls/hr  Q20H


 IV


   9/21/19 16:05


 10/21/19 16:04  9/25/19 01:09


 


 


 Dronabinol


  (Marinol)  2.5 mg  TID


 ORAL


   9/17/19 13:00


 10/17/19 12:59  9/25/19 14:00


 


 


 Heparin Sodium


  (Porcine)


  (Heparin 5000


 units/ml)  5,000 units  EVERY 12  HOURS


 SUBQ


   9/15/19 09:00


 10/15/19 08:59  9/24/19 20:57


 


 


 Insulin Aspart


  (NovoLOG)    BEFORE MEALS AND  HS


 SUBQ


   9/15/19 11:30


 10/15/19 11:29  9/24/19 22:44


 


 


 Losartan Potassium


  (Cozaar)  25 mg  DAILY


 ORAL


   9/15/19 12:00


 10/15/19 11:59  9/23/19 09:58


 


 


 Ondansetron HCl


  (Zofran)  4 mg  Q6H  PRN


 IVP


 Nausea & Vomiting  9/15/19 08:30


 10/15/19 08:29   


 


 


 Polyethylene


 Glycol


  (Miralax)  17 gm  HSPRN  PRN


 ORAL


 Constipation  9/15/19 08:30


 10/15/19 08:29   


 


 


 Vancomycin HCl


  (Firvanq)  125 mg  FOUR TIMES A  DAY


 ORAL


   9/17/19 13:00


 9/26/19 23:59  9/25/19 13:59


 

















Surya Salazar MD Sep 25, 2019 14:58

## 2019-09-25 NOTE — NUR
CASE MANAGEMENT: REVIEW



SI: FAILURE TO THRIVE . C-DIFF 

EGD / PEG PLACEMENT 09/24 

T 98.2 HR 56 RR 22 /64 % NC/3L

H/H 11.3/33.9  





IS: D5 1/2 NS IVF @ 50ML/HR

MARINOL PO TID

VANCO HCl QID

NOVOLOG SUBQ AC+HS

HEPARIN SUBQ Q12HR

GT FEEDING GLUCERNA 1.5 @ 50ML/HR 





***MED/SURG STATUS***

DCP: PATIENT IS FROM [facility]

## 2019-09-25 NOTE — NUR
NURSE NOTES:

Received patient in no apparent distress. Asleep, Non verbal. IV site patent and intact. 
G-tube in place, running Glucerna 1.5 50cc/hr, 0cc residual noted, flushed, elevated HOB. 
[place] cath draining well by gravity, yellow urine noted. Family member at bedside. Bed in 
lowest position. Call light within reach. Will continue to monitor.

## 2019-09-25 NOTE — NUR
NURSE NOTES:

Left message for pt's daughter, Navi, regarding consent for PEG placement. 



Spoke to Moises in GI to notify Horacio that a message with family

## 2019-09-25 NOTE — PULMONOLOGY PROGRESS NOTE
Assessment/Plan


Problems:  


(1) Failure to thrive


(2) At high risk for aspiration


(3) Diabetes


(4) Protein-calorie malnutrition, severe


(5) Limited mobility in bed


(6) Flexion contractures


Assessment/Plan


very poor appetite


aspiration precaution


symptomatic treatment


sliding scale


diabetic diet


check electrolytes


titrate fio2 to sat of 92%\


 


scheduled for  PEG





Subjective


ROS Limited/Unobtainable:  No


Constitutional:  Reports: no symptoms


HEENT:  Repors: no symptoms


Allergies:  


Coded Allergies:  


     LISINOPRIL (Verified  Allergy, Unknown, 9/14/19)





Objective





Last 24 Hour Vital Signs








  Date Time  Temp Pulse Resp B/P (MAP) Pulse Ox O2 Delivery O2 Flow Rate FiO2


 


9/25/19 09:00    125/76    


 


9/25/19 08:24      Room Air  


 


9/25/19 08:00 98.1 64 18 125/76 (92) 99   


 


9/25/19 04:00 99.2 63 20 138/61 (86) 95   


 


9/25/19 00:00 98.6 60 18 122/77 (92) 97   


 


9/24/19 21:00      Room Air  


 


9/24/19 20:00 98.4 61 18 139/61 (87) 96   


 


9/24/19 16:00 98.9 58 18 115/69 (84) 96   


 


9/24/19 12:00 98.0 56 18 110/63 (79) 96   

















Intake and Output  


 


 9/24/19 9/25/19





 19:00 07:00


 


Intake Total 600 ml 500 ml


 


Output Total 800 ml 400 ml


 


Balance -200 ml 100 ml


 


  


 


IV Total  500 ml


 


Other 600 ml 


 


Output Urine Total 800 ml 400 ml


 


# Voids  1


 


# Bowel Movements  2








Objective


General Appearance:  cachectic 


HEENT:  normocephalic, atraumatic


Respiratory/Chest:  chest wall non-tender, lungs clear


Breasts:  no masses


Cardiovascular:  normal peripheral pulses


Abdomen:  normal bowel sounds, soft, non tender


Genitourinary:  normal external genitalia


Extremities:  no clubbing


Neurologic/Psychiatric:  CNs II-XII grossly normal


Lymphatic:  no neck adenopathy


Laboratory Tests


9/25/19 05:25: 


White Blood Count 6.1, Red Blood Count 3.71L, Hemoglobin 11.3L, Hematocrit 33.9L

, Mean Corpuscular Volume 91, Mean Corpuscular Hemoglobin 30.6, Mean 

Corpuscular Hemoglobin Concent 33.5, Red Cell Distribution Width 12.3, Platelet 

Count 174, Mean Platelet Volume 6.4L, Neutrophils (%) (Auto) 69.6, Lymphocytes (

%) (Auto) 17.8L, Monocytes (%) (Auto) 11.1H, Eosinophils (%) (Auto) 0.5, 

Basophils (%) (Auto) 1.0, Prothrombin Time 11.9H, Prothromb Time International 

Ratio 1.1, Activated Partial Thromboplast Time 32, Sodium Level 135L, Potassium 

Level 3.5, Chloride Level 102, Carbon Dioxide Level 28, Anion Gap 5, Blood Urea 

Nitrogen 7, Creatinine 0.4L, Estimat Glomerular Filtration Rate , Glucose Level 

105, Calcium Level 8.1L





Current Medications








 Medications


  (Trade)  Dose


 Ordered  Sig/Trent


 Route


 PRN Reason  Start Time


 Stop Time Status Last Admin


Dose Admin


 


 Acetaminophen


  (Tylenol)  650 mg  Q4H  PRN


 ORAL


 fever  9/15/19 08:30


 10/15/19 08:29   


 


 


 Clonidine HCl


  (Catapres Tab)  0.1 mg  Q6H  PRN


 ORAL


 sbp above 160  9/15/19 11:15


 10/15/19 11:14  9/23/19 09:58


 


 


 Dextrose


  (Dextrose 50%)  25 ml  Q30M  PRN


 IV


 Hypoglycemia  9/15/19 08:30


 10/15/19 08:29   


 


 


 Dextrose


  (Dextrose 50%)  50 ml  Q30M  PRN


 IV


 Hypoglycemia  9/15/19 08:30


 10/15/19 08:29   


 


 


 Dextrose/Sodium


 Chloride  1,000 ml @ 


 50 mls/hr  Q20H


 IV


   9/21/19 16:05


 10/21/19 16:04  9/25/19 01:09


 


 


 Dronabinol


  (Marinol)  2.5 mg  TID


 ORAL


   9/17/19 13:00


 10/17/19 12:59  9/23/19 18:00


 


 


 Heparin Sodium


  (Porcine)


  (Heparin 5000


 units/ml)  5,000 units  EVERY 12  HOURS


 SUBQ


   9/15/19 09:00


 10/15/19 08:59  9/24/19 20:57


 


 


 Insulin Aspart


  (NovoLOG)    BEFORE MEALS AND  HS


 SUBQ


   9/15/19 11:30


 10/15/19 11:29  9/24/19 22:44


 


 


 Losartan Potassium


  (Cozaar)  25 mg  DAILY


 ORAL


   9/15/19 12:00


 10/15/19 11:59  9/23/19 09:58


 


 


 Ondansetron HCl


  (Zofran)  4 mg  Q6H  PRN


 IVP


 Nausea & Vomiting  9/15/19 08:30


 10/15/19 08:29   


 


 


 Polyethylene


 Glycol


  (Miralax)  17 gm  HSPRN  PRN


 ORAL


 Constipation  9/15/19 08:30


 10/15/19 08:29   


 


 


 Vancomycin HCl


  (Firvanq)  125 mg  FOUR TIMES A  DAY


 ORAL


   9/17/19 13:00


 9/26/19 23:59  9/24/19 20:54


 

















Chau Fitzgerald MD Sep 25, 2019 11:59

## 2019-09-25 NOTE — PRE-PROCEDURE NOTE/ATTESTATION
Pre-Procedure Note/Attestation


Complete Prior to Procedure


Planned Procedure:  not applicable


Procedure Narrative:


egd/peg





Indications for Procedure


Pre-Operative Diagnosis:


dysphagia





Attestation


I attest that I discussed the nature of the procedure; its benefits; risks and 

complications; and alternatives (and the risks and benefits of such alternatives

), prior to the procedure, with the patient (or the patient's legal 

representative).





I attest that, if there was a reasonable possibility of needing a blood 

transfusion, the patient (or the patient's legal representative) was given the 

[facility] standardized written summary, pursuant 

to the [employer] Addy Blood Safety Act ([place] Health and Safety Code # 1645, as 

amended).





I attest that I re-evaluated the patient just prior to the surgery and that 

there has been no change in the patient's H&P, except as documented below:











Shai Garg MD Sep 25, 2019 11:49

## 2019-09-26 VITALS — SYSTOLIC BLOOD PRESSURE: 137 MMHG | DIASTOLIC BLOOD PRESSURE: 71 MMHG

## 2019-09-26 VITALS — SYSTOLIC BLOOD PRESSURE: 106 MMHG | DIASTOLIC BLOOD PRESSURE: 68 MMHG

## 2019-09-26 VITALS — DIASTOLIC BLOOD PRESSURE: 54 MMHG | SYSTOLIC BLOOD PRESSURE: 128 MMHG

## 2019-09-26 VITALS — DIASTOLIC BLOOD PRESSURE: 36 MMHG | SYSTOLIC BLOOD PRESSURE: 94 MMHG

## 2019-09-26 VITALS — DIASTOLIC BLOOD PRESSURE: 55 MMHG | SYSTOLIC BLOOD PRESSURE: 129 MMHG

## 2019-09-26 VITALS — SYSTOLIC BLOOD PRESSURE: 108 MMHG | DIASTOLIC BLOOD PRESSURE: 83 MMHG

## 2019-09-26 LAB
ADD MANUAL DIFF: NO
ANION GAP SERPL CALC-SCNC: 7 MMOL/L (ref 5–15)
BASOPHILS NFR BLD AUTO: 0.5 % (ref 0–2)
BUN SERPL-MCNC: 12 MG/DL (ref 7–18)
CALCIUM SERPL-MCNC: 8.5 MG/DL (ref 8.5–10.1)
CHLORIDE SERPL-SCNC: 102 MMOL/L (ref 98–107)
CO2 SERPL-SCNC: 28 MMOL/L (ref 21–32)
CREAT SERPL-MCNC: 0.5 MG/DL (ref 0.55–1.3)
EOSINOPHIL NFR BLD AUTO: 0.8 % (ref 0–3)
ERYTHROCYTE [DISTWIDTH] IN BLOOD BY AUTOMATED COUNT: 12.5 % (ref 11.6–14.8)
HCT VFR BLD CALC: 35.6 % (ref 37–47)
HGB BLD-MCNC: 12.1 G/DL (ref 12–16)
LYMPHOCYTES NFR BLD AUTO: 17 % (ref 20–45)
MCV RBC AUTO: 91 FL (ref 80–99)
MONOCYTES NFR BLD AUTO: 10.3 % (ref 1–10)
NEUTROPHILS NFR BLD AUTO: 71.4 % (ref 45–75)
PLATELET # BLD: 192 K/UL (ref 150–450)
POTASSIUM SERPL-SCNC: 3.7 MMOL/L (ref 3.5–5.1)
RBC # BLD AUTO: 3.92 M/UL (ref 4.2–5.4)
SODIUM SERPL-SCNC: 137 MMOL/L (ref 136–145)
WBC # BLD AUTO: 7.6 K/UL (ref 4.8–10.8)

## 2019-09-26 RX ADMIN — INSULIN ASPART SCH UNITS: 100 INJECTION, SOLUTION INTRAVENOUS; SUBCUTANEOUS at 11:09

## 2019-09-26 RX ADMIN — VANCOMYCIN HYDROCHLORIDE SCH MG: 500 INJECTION, POWDER, LYOPHILIZED, FOR SOLUTION INTRAVENOUS at 17:50

## 2019-09-26 RX ADMIN — INSULIN ASPART SCH UNITS: 100 INJECTION, SOLUTION INTRAVENOUS; SUBCUTANEOUS at 17:58

## 2019-09-26 RX ADMIN — INSULIN ASPART SCH UNITS: 100 INJECTION, SOLUTION INTRAVENOUS; SUBCUTANEOUS at 20:47

## 2019-09-26 RX ADMIN — VANCOMYCIN HYDROCHLORIDE SCH MG: 500 INJECTION, POWDER, LYOPHILIZED, FOR SOLUTION INTRAVENOUS at 09:03

## 2019-09-26 RX ADMIN — VANCOMYCIN HYDROCHLORIDE SCH MG: 500 INJECTION, POWDER, LYOPHILIZED, FOR SOLUTION INTRAVENOUS at 20:53

## 2019-09-26 RX ADMIN — LOSARTAN POTASSIUM SCH MG: 25 TABLET, FILM COATED ORAL at 09:00

## 2019-09-26 RX ADMIN — DRONABINOL SCH MG: 2.5 CAPSULE ORAL at 09:03

## 2019-09-26 RX ADMIN — HEPARIN SODIUM SCH UNITS: 5000 INJECTION INTRAVENOUS; SUBCUTANEOUS at 09:04

## 2019-09-26 RX ADMIN — HEPARIN SODIUM SCH UNITS: 5000 INJECTION INTRAVENOUS; SUBCUTANEOUS at 20:47

## 2019-09-26 RX ADMIN — DRONABINOL SCH MG: 2.5 CAPSULE ORAL at 17:50

## 2019-09-26 RX ADMIN — INSULIN ASPART SCH UNITS: 100 INJECTION, SOLUTION INTRAVENOUS; SUBCUTANEOUS at 06:30

## 2019-09-26 RX ADMIN — VANCOMYCIN HYDROCHLORIDE SCH MG: 500 INJECTION, POWDER, LYOPHILIZED, FOR SOLUTION INTRAVENOUS at 12:11

## 2019-09-26 RX ADMIN — DRONABINOL SCH MG: 2.5 CAPSULE ORAL at 12:11

## 2019-09-26 NOTE — NUR
NOTES







SPOKE WITH SPENCER FROM [facility],UNABLE TO ACCEPT PT BACK UNTIL CLEARED FOR C-DIFF. NURSE 
MADE AWARE TO CALL MD TO OBTAIN AN ORDER FOR C-DIFF STOOL.

## 2019-09-26 NOTE — PULMONOLOGY PROGRESS NOTE
Assessment/Plan


Problems:  


(1) Failure to thrive


(2) At high risk for aspiration


(3) Diabetes


(4) Protein-calorie malnutrition, severe


(5) Limited mobility in bed


(6) Flexion contractures


Assessment/Plan





aspiration precaution


symptomatic treatment


sliding scale


diabetic diet


check electrolytes


titrate fio2 to sat of 92%\


 


PEG is done,





Subjective


ROS Limited/Unobtainable:  No


Constitutional:  Reports: no symptoms


HEENT:  Repors: no symptoms


Allergies:  


Coded Allergies:  


     LISINOPRIL (Verified  Allergy, Unknown, 9/14/19)





Objective





Last 24 Hour Vital Signs








  Date Time  Temp Pulse Resp B/P (MAP) Pulse Ox O2 Delivery O2 Flow Rate FiO2


 


9/26/19 09:00    128/54    


 


9/26/19 08:00 98.0 62 19 128/54 (78) 98   


 


9/26/19 07:45      Room Air  


 


9/26/19 07:42      Room Air  


 


9/26/19 04:00 98.6 61 18 94/36 (55) 97   


 


9/26/19 00:00 98.5 65 18 137/71 (93) 98   


 


9/25/19 21:00      Room Air  


 


9/25/19 20:00 98.5 62 20 99/58 (72) 98   


 


9/25/19 16:00 98.0 76 18 131/86 (101) 96   


 


9/25/19 13:15 99.1 76 16 110/60 (77) 98   


 


9/25/19 12:45 98.2 56 22 163/64 100 Room Air  


 


9/25/19 12:35  58 15 162/65 100 Room Air  


 


9/25/19 12:25  54 15 159/72 100 Nasal Cannula 3 


 


9/25/19 12:20  55 18 150/68 100 Nasal Cannula 3 


 


9/25/19 12:18  58 18  98   


 


9/25/19 12:16 98.9 53 24 149/64 100 Nasal Cannula 3 

















Intake and Output  


 


 9/25/19 9/26/19





 19:00 07:00


 


Intake Total 700 ml 1430 ml


 


Output Total 280 ml 200 ml


 


Balance 420 ml 1230 ml


 


  


 


Free Water 300 ml 330 ml


 


IV Total 300 ml 550 ml


 


Tube Feeding 100 ml 550 ml


 


Output Urine Total 280 ml 200 ml


 


# Voids  1


 


# Bowel Movements 2 








Objective


General Appearance:  cachectic 


HEENT:  normocephalic, atraumatic


Respiratory/Chest:  chest wall non-tender, lungs clear


Breasts:  no masses


Cardiovascular:  normal peripheral pulses


Abdomen:  normal bowel sounds, soft, non tender


Genitourinary:  normal external genitalia


Extremities:  no clubbing


Neurologic/Psychiatric:  CNs II-XII grossly normal


Lymphatic:  no neck adenopathy


Laboratory Tests


9/26/19 05:37: 


White Blood Count 7.6, Red Blood Count 3.92L, Hemoglobin 12.1, Hematocrit 35.6L

, Mean Corpuscular Volume 91, Mean Corpuscular Hemoglobin 30.7, Mean 

Corpuscular Hemoglobin Concent 33.9, Red Cell Distribution Width 12.5, Platelet 

Count 192, Mean Platelet Volume 6.4L, Neutrophils (%) (Auto) 71.4, Lymphocytes (

%) (Auto) 17.0L, Monocytes (%) (Auto) 10.3H, Eosinophils (%) (Auto) 0.8, 

Basophils (%) (Auto) 0.5, Sodium Level 137, Potassium Level 3.7, Chloride Level 

102, Carbon Dioxide Level 28, Anion Gap 7, Blood Urea Nitrogen 12, Creatinine 

0.5L, Estimat Glomerular Filtration Rate , Glucose Level 106, Calcium Level 8.5





Current Medications








 Medications


  (Trade)  Dose


 Ordered  Sig/Trent


 Route


 PRN Reason  Start Time


 Stop Time Status Last Admin


Dose Admin


 


 Acetaminophen


  (Tylenol)  650 mg  Q4H  PRN


 ORAL


 fever  9/15/19 08:30


 10/15/19 08:29  9/25/19 22:17


 


 


 Clonidine HCl


  (Catapres Tab)  0.1 mg  Q6H  PRN


 ORAL


 sbp above 160  9/15/19 11:15


 10/15/19 11:14  9/23/19 09:58


 


 


 Dextrose


  (Dextrose 50%)  25 ml  Q30M  PRN


 IV


 Hypoglycemia  9/15/19 08:30


 10/15/19 08:29   


 


 


 Dextrose


  (Dextrose 50%)  50 ml  Q30M  PRN


 IV


 Hypoglycemia  9/15/19 08:30


 10/15/19 08:29   


 


 


 Dronabinol


  (Marinol)  2.5 mg  TID


 ORAL


   9/17/19 13:00


 10/17/19 12:59  9/26/19 09:03


 


 


 Heparin Sodium


  (Porcine)


  (Heparin 5000


 units/ml)  5,000 units  EVERY 12  HOURS


 SUBQ


   9/15/19 09:00


 10/15/19 08:59  9/26/19 09:04


 


 


 Insulin Aspart


  (NovoLOG)    BEFORE MEALS AND  HS


 SUBQ


   9/15/19 11:30


 10/15/19 11:29  9/25/19 22:12


 


 


 Losartan Potassium


  (Cozaar)  25 mg  DAILY


 ORAL


   9/15/19 12:00


 10/15/19 11:59  9/23/19 09:58


 


 


 Ondansetron HCl


  (Zofran)  4 mg  Q6H  PRN


 IVP


 Nausea & Vomiting  9/15/19 08:30


 10/15/19 08:29   


 


 


 Polyethylene


 Glycol


  (Miralax)  17 gm  HSPRN  PRN


 ORAL


 Constipation  9/15/19 08:30


 10/15/19 08:29   


 


 


 Vancomycin HCl


  (Firvanq)  125 mg  FOUR TIMES A  DAY


 ORAL


   9/17/19 13:00


 9/26/19 23:59  9/26/19 09:03


 

















Chau Fitzgerald MD Sep 26, 2019 11:38

## 2019-09-26 NOTE — INFECTIOUS DISEASES PROG NOTE
Assessment/Plan


83 yo female with PMHx of DM and failure  to thrive who was admitted to the 

hospital on 9/14/19. 





C. diff Diarrhea resolving 


   Toxin assay Pos 9/16/19


  


UTI, Sp Rx 


   UA (+)


   Urine Cx 9/14/19 - E,. coli (Sen to cefazolin)











sp PEG 


DM


Failure to thrive





Plan





- Continue PO Vancomycin # 10 /14 ( may DC upon Discharge) ,


- OK to D/C from an ID perspective w cont oral Vanco 





      - 9/20/19 SP Ceftriaxone #6











- Monitor BMs


- Monitor CBC and Temps





Subjective


Allergies:  


Coded Allergies:  


     LISINOPRIL (Verified  Allergy, Unknown, 9/14/19)


Subjective


Sp   PEG





Objective


Vital Signs





Last 24 Hour Vital Signs








  Date Time  Temp Pulse Resp B/P (MAP) Pulse Ox O2 Delivery O2 Flow Rate FiO2


 


9/26/19 09:00    128/54    


 


9/26/19 08:00 98.0 62 19 128/54 (78) 98   


 


9/26/19 07:45      Room Air  


 


9/26/19 07:42      Room Air  


 


9/26/19 04:00 98.6 61 18 94/36 (55) 97   


 


9/26/19 00:00 98.5 65 18 137/71 (93) 98   


 


9/25/19 21:00      Room Air  


 


9/25/19 20:00 98.5 62 20 99/58 (72) 98   


 


9/25/19 16:00 98.0 76 18 131/86 (101) 96   


 


9/25/19 13:15 99.1 76 16 110/60 (77) 98   


 


9/25/19 12:45 98.2 56 22 163/64 100 Room Air  


 


9/25/19 12:35  58 15 162/65 100 Room Air  


 


9/25/19 12:25  54 15 159/72 100 Nasal Cannula 3 


 


9/25/19 12:20  55 18 150/68 100 Nasal Cannula 3 


 


9/25/19 12:18  58 18  98   


 


9/25/19 12:16 98.9 53 24 149/64 100 Nasal Cannula 3 








Height (Feet):  5


Height (Inches):  1.00


Weight (Pounds):  95


HEENT:  mucous membranes moist


Respiratory/Chest:  normal breath sounds


Cardiovascular:  regular rhythm


Abdomen:  soft, non tender





Laboratory Tests








Test


  9/26/19


05:37


 


White Blood Count


  7.6 K/UL


(4.8-10.8)


 


Red Blood Count


  3.92 M/UL


(4.20-5.40)  L


 


Hemoglobin


  12.1 G/DL


(12.0-16.0)


 


Hematocrit


  35.6 %


(37.0-47.0)  L


 


Mean Corpuscular Volume 91 FL (80-99)  


 


Mean Corpuscular Hemoglobin


  30.7 PG


(27.0-31.0)


 


Mean Corpuscular Hemoglobin


Concent 33.9 G/DL


(32.0-36.0)


 


Red Cell Distribution Width


  12.5 %


(11.6-14.8)


 


Platelet Count


  192 K/UL


(150-450)


 


Mean Platelet Volume


  6.4 FL


(6.5-10.1)  L


 


Neutrophils (%) (Auto)


  71.4 %


(45.0-75.0)


 


Lymphocytes (%) (Auto)


  17.0 %


(20.0-45.0)  L


 


Monocytes (%) (Auto)


  10.3 %


(1.0-10.0)  H


 


Eosinophils (%) (Auto)


  0.8 %


(0.0-3.0)


 


Basophils (%) (Auto)


  0.5 %


(0.0-2.0)


 


Sodium Level


  137 MMOL/L


(136-145)


 


Potassium Level


  3.7 MMOL/L


(3.5-5.1)


 


Chloride Level


  102 MMOL/L


()


 


Carbon Dioxide Level


  28 MMOL/L


(21-32)


 


Anion Gap


  7 mmol/L


(5-15)


 


Blood Urea Nitrogen


  12 mg/dL


(7-18)


 


Creatinine


  0.5 MG/DL


(0.55-1.30)  L


 


Estimat Glomerular Filtration


Rate  mL/min (>60)  


 


 


Glucose Level


  106 MG/DL


()


 


Calcium Level


  8.5 MG/DL


(8.5-10.1)











Current Medications








 Medications


  (Trade)  Dose


 Ordered  Sig/Trent


 Route


 PRN Reason  Start Time


 Stop Time Status Last Admin


Dose Admin


 


 Acetaminophen


  (Tylenol)  650 mg  Q4H  PRN


 ORAL


 fever  9/15/19 08:30


 10/15/19 08:29  9/25/19 22:17


 


 


 Clonidine HCl


  (Catapres Tab)  0.1 mg  Q6H  PRN


 ORAL


 sbp above 160  9/15/19 11:15


 10/15/19 11:14  9/23/19 09:58


 


 


 Dextrose


  (Dextrose 50%)  25 ml  Q30M  PRN


 IV


 Hypoglycemia  9/15/19 08:30


 10/15/19 08:29   


 


 


 Dextrose


  (Dextrose 50%)  50 ml  Q30M  PRN


 IV


 Hypoglycemia  9/15/19 08:30


 10/15/19 08:29   


 


 


 Dronabinol


  (Marinol)  2.5 mg  TID


 ORAL


   9/17/19 13:00


 10/17/19 12:59  9/26/19 12:11


 


 


 Heparin Sodium


  (Porcine)


  (Heparin 5000


 units/ml)  5,000 units  EVERY 12  HOURS


 SUBQ


   9/15/19 09:00


 10/15/19 08:59  9/26/19 09:04


 


 


 Insulin Aspart


  (NovoLOG)    BEFORE MEALS AND  HS


 SUBQ


   9/15/19 11:30


 10/15/19 11:29  9/25/19 22:12


 


 


 Losartan Potassium


  (Cozaar)  25 mg  DAILY


 ORAL


   9/15/19 12:00


 10/15/19 11:59  9/23/19 09:58


 


 


 Ondansetron HCl


  (Zofran)  4 mg  Q6H  PRN


 IVP


 Nausea & Vomiting  9/15/19 08:30


 10/15/19 08:29   


 


 


 Polyethylene


 Glycol


  (Miralax)  17 gm  HSPRN  PRN


 ORAL


 Constipation  9/15/19 08:30


 10/15/19 08:29   


 


 


 Vancomycin HCl


  (Firvanq)  125 mg  FOUR TIMES A  DAY


 ORAL


   9/17/19 13:00


 9/26/19 23:59  9/26/19 12:11


 

















Surya Salazar MD Sep 26, 2019 12:16

## 2019-09-26 NOTE — GI PROGRESS NOTE
Assessment/Plan


Problems:  


(1) Weakness


ICD Codes:  R53.1 - Weakness


SNOMED:  70774886


(2) Malnutrition


ICD Codes:  E46 - Unspecified protein-calorie malnutrition


SNOMED:  79247374


(3) Failure to thrive


SNOMED:  66609500


Qualifiers:  


   Qualified Codes:  R62.7 - Adult failure to thrive


Status:  stable


Status Narrative


Discussed with Dr. Garg.


Assessment/Plan


SUMMARY OF FINDINGS:  Status post successful PEG placement.





RECOMMENDATIONS:


okay for DC per GI standpoint


maintain regular diet for oral gratification


Push p.o.


Strict aspiration precautions


One-to-one feeder


cont Marinol


Follow labs





The patient was seen and examined at bedside and all new and available data was 

reviewed in the patients chart. I agree with the above findings, impression 

and plan.  (Patient seen earlier today. Signature stamp does not reflect 

patient encounter time.). - Shai Garg MD





Subjective


Subjective


Limited





Objective





Last 24 Hour Vital Signs








  Date Time  Temp Pulse Resp B/P (MAP) Pulse Ox O2 Delivery O2 Flow Rate FiO2


 


9/26/19 09:00    128/54    


 


9/26/19 08:00 98.0 62 19 128/54 (78) 98   


 


9/26/19 07:45      Room Air  


 


9/26/19 07:42      Room Air  


 


9/26/19 04:00 98.6 61 18 94/36 (55) 97   


 


9/26/19 00:00 98.5 65 18 137/71 (93) 98   


 


9/25/19 21:00      Room Air  


 


9/25/19 20:00 98.5 62 20 99/58 (72) 98   


 


9/25/19 16:00 98.0 76 18 131/86 (101) 96   


 


9/25/19 13:15 99.1 76 16 110/60 (77) 98   


 


9/25/19 12:45 98.2 56 22 163/64 100 Room Air  


 


9/25/19 12:35  58 15 162/65 100 Room Air  


 


9/25/19 12:25  54 15 159/72 100 Nasal Cannula 3 


 


9/25/19 12:20  55 18 150/68 100 Nasal Cannula 3 


 


9/25/19 12:18  58 18  98   


 


9/25/19 12:16 98.9 53 24 149/64 100 Nasal Cannula 3 

















Intake and Output  


 


 9/25/19 9/26/19





 19:00 07:00


 


Intake Total 700 ml 1430 ml


 


Output Total 280 ml 200 ml


 


Balance 420 ml 1230 ml


 


  


 


Free Water 300 ml 330 ml


 


IV Total 300 ml 550 ml


 


Tube Feeding 100 ml 550 ml


 


Output Urine Total 280 ml 200 ml


 


# Voids  1


 


# Bowel Movements 2 











Laboratory Tests








Test


  9/26/19


05:37


 


White Blood Count


  7.6 K/UL


(4.8-10.8)


 


Red Blood Count


  3.92 M/UL


(4.20-5.40)  L


 


Hemoglobin


  12.1 G/DL


(12.0-16.0)


 


Hematocrit


  35.6 %


(37.0-47.0)  L


 


Mean Corpuscular Volume 91 FL (80-99)  


 


Mean Corpuscular Hemoglobin


  30.7 PG


(27.0-31.0)


 


Mean Corpuscular Hemoglobin


Concent 33.9 G/DL


(32.0-36.0)


 


Red Cell Distribution Width


  12.5 %


(11.6-14.8)


 


Platelet Count


  192 K/UL


(150-450)


 


Mean Platelet Volume


  6.4 FL


(6.5-10.1)  L


 


Neutrophils (%) (Auto)


  71.4 %


(45.0-75.0)


 


Lymphocytes (%) (Auto)


  17.0 %


(20.0-45.0)  L


 


Monocytes (%) (Auto)


  10.3 %


(1.0-10.0)  H


 


Eosinophils (%) (Auto)


  0.8 %


(0.0-3.0)


 


Basophils (%) (Auto)


  0.5 %


(0.0-2.0)


 


Sodium Level


  137 MMOL/L


(136-145)


 


Potassium Level


  3.7 MMOL/L


(3.5-5.1)


 


Chloride Level


  102 MMOL/L


()


 


Carbon Dioxide Level


  28 MMOL/L


(21-32)


 


Anion Gap


  7 mmol/L


(5-15)


 


Blood Urea Nitrogen


  12 mg/dL


(7-18)


 


Creatinine


  0.5 MG/DL


(0.55-1.30)  L


 


Estimat Glomerular Filtration


Rate  mL/min (>60)  


 


 


Glucose Level


  106 MG/DL


()


 


Calcium Level


  8.5 MG/DL


(8.5-10.1)








Height (Feet):  5


Height (Inches):  1.00


Weight (Pounds):  95


General Appearance:  alert, thin


Cardiovascular:  normal rate


Respiratory/Chest:  normal breath sounds


Abdominal Exam:  soft


Objective


Reported by the RN the patient tolerated approximately 25% of her meals











Malachi Mcqueen NP Sep 26, 2019 12:00

## 2019-09-26 NOTE — NUR
DISCHARGE PLANNING



DISCHARGE ORDER NOTED



Patient has been accepted to;



[facility]

[address] [place] [address]





Bed:21-A

Skilled 

[phone] for Nurse to Nurse report



[facility] ETA for transportation: 18:00

## 2019-09-26 NOTE — NUR
NURSE NOTES:

Called report to JESUS Jackson at [facility] 

-------------------------------------------------------------------------------

Addendum: 09/26/19 at 1702 by JCARLOS DELCID RN

-------------------------------------------------------------------------------

NURSE NOTES:

Renetta stated she cannot accept full report until the "CNS" is sent over showing the C Diff 
is resolved. RN spoke to Loree in  and Loree will contact [facility] to follow up as 
she has already spoke with their  and the pt has been accepted

-------------------------------------------------------------------------------

Addendum: 09/26/19 at 1725 by JCARLOS DELCID RN

-------------------------------------------------------------------------------

NURSE NOTES:

Spoke to LIZBET Ralph, [facility] will not accept pt until C Diff test is done. JESUS asked Dr. Salazar for order for C Diff Toxin. 



Dr. Salazar said "No need to order, that is mal-practice, they should take pt back." 
Notified Dr. Salazar that  and myself have gone back and forth with facility many times, 
but they will not accept pt without C Diff F/U exam being done. 

-------------------------------------------------------------------------------

Addendum: 09/26/19 at 1824 by JCARLOS DELCID RN

-------------------------------------------------------------------------------

NURSE NOTES:

No new order received from Dr. Salazar regarding C. Diff toxin. RN discussed with LIZBET Ralph. Asked if another physician should be contacted for order. LIZBET Ralph stated she will 
complete discharge work tomorrow.

## 2019-09-26 NOTE — NUR
NURSE NOTES:

Received patient in no apparent distress. Asleep, Non verbal. IV site patent and intact. 
G-tube in place, running Glucerna 1.5 50cc/hr, 0cc residual noted, flushed, elevated HOB. 
[place] cath draining well by gravity, yellow urine noted. Family members are at bedside. Bed 
in lowest position. Call light within reach. Will continue to monitor.

## 2019-09-26 NOTE — GENERAL PROGRESS NOTE
Assessment/Plan


Problem List:  


(1) Diabetes


ICD Codes:  E11.9 - Type 2 diabetes mellitus without complications


SNOMED:  10981909


(2) Malnutrition


ICD Codes:  E46 - Unspecified protein-calorie malnutrition


SNOMED:  34393065


(3) Failure to thrive


SNOMED:  08901709


Qualifiers:  


   Qualified Codes:  R62.7 - Adult failure to thrive


(4) Weakness


ICD Codes:  R53.1 - Weakness


SNOMED:  28967964


(5) UTI (urinary tract infection)


ICD Codes:  N39.0 - Urinary tract infection, site not specified


SNOMED:  77512593


Qualifiers:  


   Qualified Codes:  N39.0 - Urinary tract infection, site not specified


Status:  stable


Assessment/Plan:


pt diet abx neuro dc if clear





Subjective


Constitutional:  Reports: weakness


Allergies:  


Coded Allergies:  


     LISINOPRIL (Verified  Allergy, Unknown, 9/14/19)


All Systems:  reviewed and negative except above


Subjective


calm sleepy  in bed





Objective





Last 24 Hour Vital Signs








  Date Time  Temp Pulse Resp B/P (MAP) Pulse Ox O2 Delivery O2 Flow Rate FiO2


 


9/26/19 12:00 97.0 75 20 106/68 (81) 100   


 


9/26/19 09:00    128/54    


 


9/26/19 08:00 98.0 62 19 128/54 (78) 98   


 


9/26/19 07:45      Room Air  


 


9/26/19 07:42      Room Air  


 


9/26/19 04:00 98.6 61 18 94/36 (55) 97   


 


9/26/19 00:00 98.5 65 18 137/71 (93) 98   


 


9/25/19 21:00      Room Air  


 


9/25/19 20:00 98.5 62 20 99/58 (72) 98   


 


9/25/19 16:00 98.0 76 18 131/86 (101) 96   

















Intake and Output  


 


 9/25/19 9/26/19





 19:00 07:00


 


Intake Total 700 ml 1430 ml


 


Output Total 280 ml 200 ml


 


Balance 420 ml 1230 ml


 


  


 


Free Water 300 ml 330 ml


 


IV Total 300 ml 550 ml


 


Tube Feeding 100 ml 550 ml


 


Output Urine Total 280 ml 200 ml


 


# Voids  1


 


# Bowel Movements 2 








Laboratory Tests


9/26/19 05:37: 


White Blood Count 7.6, Red Blood Count 3.92L, Hemoglobin 12.1, Hematocrit 35.6L

, Mean Corpuscular Volume 91, Mean Corpuscular Hemoglobin 30.7, Mean 

Corpuscular Hemoglobin Concent 33.9, Red Cell Distribution Width 12.5, Platelet 

Count 192, Mean Platelet Volume 6.4L, Neutrophils (%) (Auto) 71.4, Lymphocytes (

%) (Auto) 17.0L, Monocytes (%) (Auto) 10.3H, Eosinophils (%) (Auto) 0.8, 

Basophils (%) (Auto) 0.5, Sodium Level 137, Potassium Level 3.7, Chloride Level 

102, Carbon Dioxide Level 28, Anion Gap 7, Blood Urea Nitrogen 12, Creatinine 

0.5L, Estimat Glomerular Filtration Rate , Glucose Level 106, Calcium Level 8.5


Height (Feet):  5


Height (Inches):  1.00


Weight (Pounds):  95


General Appearance:  lethargic


EENT:  normal ENT inspection


Neck:  normal alignment


Cardiovascular:  normal peripheral pulses, normal rate, regular rhythm


Respiratory/Chest:  chest wall non-tender, lungs clear, normal breath sounds


Abdomen:  normal bowel sounds, non tender, soft


Extremities:  normal inspection


Edema:  no edema noted Arm (L), no edema noted Arm (R), no edema noted Leg (L), 

no edema noted Leg (R), no edema noted Pedal (L), no edema noted Pedal (R), no 

edema noted Generalized


Neurologic:  motor weakness


Skin:  normal pigmentation, warm/dry











Chino Pineda DO Sep 26, 2019 14:35

## 2019-09-26 NOTE — NUR
*-* DISCHARGE PLANNING *-*



PATIENT HAS BEEN REFERRED BACK TO:



[facility]

P: [phone]

F: [phone]

## 2019-09-26 NOTE — NUR
*-* INSURANCE *-*



ALL CLINICALS AND REVIEWS HAVE BEEN FAXED TO:



[facility] 

FAX CLINICALS  [phone]

## 2019-09-27 VITALS — SYSTOLIC BLOOD PRESSURE: 114 MMHG | DIASTOLIC BLOOD PRESSURE: 50 MMHG

## 2019-09-27 VITALS — DIASTOLIC BLOOD PRESSURE: 54 MMHG | SYSTOLIC BLOOD PRESSURE: 118 MMHG

## 2019-09-27 VITALS — SYSTOLIC BLOOD PRESSURE: 122 MMHG | DIASTOLIC BLOOD PRESSURE: 62 MMHG

## 2019-09-27 VITALS — DIASTOLIC BLOOD PRESSURE: 50 MMHG | SYSTOLIC BLOOD PRESSURE: 106 MMHG

## 2019-09-27 LAB
ADD MANUAL DIFF: NO
ANION GAP SERPL CALC-SCNC: 5 MMOL/L (ref 5–15)
BASOPHILS NFR BLD AUTO: 0.4 % (ref 0–2)
BUN SERPL-MCNC: 17 MG/DL (ref 7–18)
CALCIUM SERPL-MCNC: 9 MG/DL (ref 8.5–10.1)
CHLORIDE SERPL-SCNC: 102 MMOL/L (ref 98–107)
CO2 SERPL-SCNC: 32 MMOL/L (ref 21–32)
CREAT SERPL-MCNC: 0.5 MG/DL (ref 0.55–1.3)
EOSINOPHIL NFR BLD AUTO: 1 % (ref 0–3)
ERYTHROCYTE [DISTWIDTH] IN BLOOD BY AUTOMATED COUNT: 12.4 % (ref 11.6–14.8)
HCT VFR BLD CALC: 40.2 % (ref 37–47)
HGB BLD-MCNC: 13.2 G/DL (ref 12–16)
LYMPHOCYTES NFR BLD AUTO: 19.2 % (ref 20–45)
MCV RBC AUTO: 92 FL (ref 80–99)
MONOCYTES NFR BLD AUTO: 11.2 % (ref 1–10)
NEUTROPHILS NFR BLD AUTO: 68.3 % (ref 45–75)
PLATELET # BLD: 223 K/UL (ref 150–450)
POTASSIUM SERPL-SCNC: 4.1 MMOL/L (ref 3.5–5.1)
RBC # BLD AUTO: 4.37 M/UL (ref 4.2–5.4)
SODIUM SERPL-SCNC: 139 MMOL/L (ref 136–145)
WBC # BLD AUTO: 7.6 K/UL (ref 4.8–10.8)

## 2019-09-27 RX ADMIN — DRONABINOL SCH MG: 2.5 CAPSULE ORAL at 09:11

## 2019-09-27 RX ADMIN — INSULIN ASPART SCH UNITS: 100 INJECTION, SOLUTION INTRAVENOUS; SUBCUTANEOUS at 12:05

## 2019-09-27 RX ADMIN — VANCOMYCIN HYDROCHLORIDE SCH MG: 500 INJECTION, POWDER, LYOPHILIZED, FOR SOLUTION INTRAVENOUS at 09:11

## 2019-09-27 RX ADMIN — INSULIN ASPART SCH UNITS: 100 INJECTION, SOLUTION INTRAVENOUS; SUBCUTANEOUS at 06:30

## 2019-09-27 RX ADMIN — HEPARIN SODIUM SCH UNITS: 5000 INJECTION INTRAVENOUS; SUBCUTANEOUS at 09:13

## 2019-09-27 RX ADMIN — LOSARTAN POTASSIUM SCH MG: 25 TABLET, FILM COATED ORAL at 09:11

## 2019-09-27 NOTE — NUR
NURSE NOTES:

received report from JESUS Nichols. patient in bed, sleeping. no respiratory distress noted. no 
facial grimacing. Allyson left wrist 24. saline lock intact. GTF runnign glucerna 1.5 @50. HOB 
at all times. f/c for retention draining. yellow. no sediments. bed in the lowest position 
and locked. call light within reach. alarm on. will continue to provide plan of care.

## 2019-09-27 NOTE — NUR
NOTES







RECEIVE CALL FROM SPENCER AT [place], [place] TO SEND THE PT TODAY. PT TO GO TO ROOM 21 BED A.

## 2019-09-27 NOTE — GI PROGRESS NOTE
Assessment/Plan


Problems:  


(1) Weakness


ICD Codes:  R53.1 - Weakness


SNOMED:  82702036


(2) Malnutrition


ICD Codes:  E46 - Unspecified protein-calorie malnutrition


SNOMED:  24257782


(3) Failure to thrive


SNOMED:  57014068


Qualifiers:  


   Qualified Codes:  R62.7 - Adult failure to thrive


Status:  stable, unchanged


Status Narrative


Discussed with Dr. Garg.


Assessment/Plan


SUMMARY OF FINDINGS:  Status post successful PEG placement.





RECOMMENDATIONS:


okay for DC per GI standpoint


maintain regular diet for oral gratification


Push p.o.


Strict aspiration precautions


One-to-one feeder


cont Marinol


Follow labs





The patient was seen and examined at bedside and all new and available data was 

reviewed in the patients chart. I agree with the above findings, impression 

and plan.  (Patient seen earlier today. Signature stamp does not reflect 

patient encounter time.). - Shai Garg MD





Subjective


Subjective


Limited





Objective





Last 24 Hour Vital Signs








  Date Time  Temp Pulse Resp B/P (MAP) Pulse Ox O2 Delivery O2 Flow Rate FiO2


 


9/27/19 09:11    118/54    


 


9/27/19 08:00 97.7 62 19 118/54 (75) 99   


 


9/27/19 04:00 97.9 60 18 114/50 (71) 100   


 


9/27/19 00:00 98.3 66 18 106/50 (68) 97   


 


9/26/19 21:00      Room Air  


 


9/26/19 20:00 98.6 70 18 129/55 (79) 99   


 


9/26/19 16:00 97.2 61 19 108/83 (91) 100   


 


9/26/19 12:00 97.0 75 20 106/68 (81) 100   

















Intake and Output  


 


 9/26/19 9/27/19





 18:59 06:59


 


Intake Total 550 ml 930 ml


 


Output Total 400 ml 750 ml


 


Balance 150 ml 180 ml


 


  


 


Intake Oral 100 ml 


 


Free Water 400 ml 330 ml


 


Tube Feeding 50 ml 600 ml


 


Output Urine Total 400 ml 750 ml


 


# Bowel Movements  1











Laboratory Tests








Test


  9/27/19


06:00


 


White Blood Count


  7.6 K/UL


(4.8-10.8)


 


Red Blood Count


  4.37 M/UL


(4.20-5.40)


 


Hemoglobin


  13.2 G/DL


(12.0-16.0)


 


Hematocrit


  40.2 %


(37.0-47.0)


 


Mean Corpuscular Volume 92 FL (80-99)  


 


Mean Corpuscular Hemoglobin


  30.2 PG


(27.0-31.0)


 


Mean Corpuscular Hemoglobin


Concent 32.8 G/DL


(32.0-36.0)


 


Red Cell Distribution Width


  12.4 %


(11.6-14.8)


 


Platelet Count


  223 K/UL


(150-450)


 


Mean Platelet Volume


  6.8 FL


(6.5-10.1)


 


Neutrophils (%) (Auto)


  68.3 %


(45.0-75.0)


 


Lymphocytes (%) (Auto)


  19.2 %


(20.0-45.0)  L


 


Monocytes (%) (Auto)


  11.2 %


(1.0-10.0)  H


 


Eosinophils (%) (Auto)


  1.0 %


(0.0-3.0)


 


Basophils (%) (Auto)


  0.4 %


(0.0-2.0)


 


Sodium Level


  139 MMOL/L


(136-145)


 


Potassium Level


  4.1 MMOL/L


(3.5-5.1)


 


Chloride Level


  102 MMOL/L


()


 


Carbon Dioxide Level


  32 MMOL/L


(21-32)


 


Anion Gap


  5 mmol/L


(5-15)


 


Blood Urea Nitrogen


  17 mg/dL


(7-18)


 


Creatinine


  0.5 MG/DL


(0.55-1.30)  L


 


Estimat Glomerular Filtration


Rate  mL/min (>60)  


 


 


Glucose Level


  98 MG/DL


()


 


Calcium Level


  9.0 MG/DL


(8.5-10.1)








Height (Feet):  5


Height (Inches):  1.00


Weight (Pounds):  95


General Appearance:  alert, thin


Cardiovascular:  normal rate


Abdominal Exam:  GT site - c/d/i


Objective


Reported by the RN the patient tolerated approximately 25% of her meals











Malachi Mcqueen NP Sep 27, 2019 10:37

## 2019-09-27 NOTE — GENERAL PROGRESS NOTE
Assessment/Plan


Problem List:  


(1) Diabetes


ICD Codes:  E11.9 - Type 2 diabetes mellitus without complications


SNOMED:  72594494


(2) Malnutrition


ICD Codes:  E46 - Unspecified protein-calorie malnutrition


SNOMED:  20164999


(3) Failure to thrive


SNOMED:  63540836


Qualifiers:  


   Qualified Codes:  R62.7 - Adult failure to thrive


(4) Weakness


ICD Codes:  R53.1 - Weakness


SNOMED:  61292274


(5) UTI (urinary tract infection)


ICD Codes:  N39.0 - Urinary tract infection, site not specified


SNOMED:  93867777


Qualifiers:  


   Qualified Codes:  N39.0 - Urinary tract infection, site not specified


Status:  stable, progressing


Assessment/Plan:


pt diet abx neuro cbc bmp am dc if clear





Subjective


Constitutional:  Reports: weakness


Allergies:  


Coded Allergies:  


     LISINOPRIL (Verified  Allergy, Unknown, 9/14/19)


All Systems:  reviewed and negative except above


Subjective


calm sleepy  in bed





Objective





Last 24 Hour Vital Signs








  Date Time  Temp Pulse Resp B/P (MAP) Pulse Ox O2 Delivery O2 Flow Rate FiO2


 


9/27/19 08:00 97.7 62 19 118/54 (75) 99   


 


9/27/19 04:00 97.9 60 18 114/50 (71) 100   


 


9/27/19 00:00 98.3 66 18 106/50 (68) 97   


 


9/26/19 21:00      Room Air  


 


9/26/19 20:00 98.6 70 18 129/55 (79) 99   


 


9/26/19 16:00 97.2 61 19 108/83 (91) 100   


 


9/26/19 12:00 97.0 75 20 106/68 (81) 100   


 


9/26/19 09:00    128/54    

















Intake and Output  


 


 9/26/19 9/27/19





 18:59 06:59


 


Intake Total 550 ml 930 ml


 


Output Total 400 ml 750 ml


 


Balance 150 ml 180 ml


 


  


 


Intake Oral 100 ml 


 


Free Water 400 ml 330 ml


 


Tube Feeding 50 ml 600 ml


 


Output Urine Total 400 ml 750 ml


 


# Bowel Movements  1








Laboratory Tests


9/27/19 06:00: 


White Blood Count 7.6, Red Blood Count 4.37, Hemoglobin 13.2, Hematocrit 40.2, 

Mean Corpuscular Volume 92, Mean Corpuscular Hemoglobin 30.2, Mean Corpuscular 

Hemoglobin Concent 32.8, Red Cell Distribution Width 12.4, Platelet Count 223, 

Mean Platelet Volume 6.8, Neutrophils (%) (Auto) 68.3, Lymphocytes (%) (Auto) 

19.2L, Monocytes (%) (Auto) 11.2H, Eosinophils (%) (Auto) 1.0, Basophils (%) (

Auto) 0.4, Sodium Level 139, Potassium Level 4.1, Chloride Level 102, Carbon 

Dioxide Level 32, Anion Gap 5, Blood Urea Nitrogen 17, Creatinine 0.5L, Estimat 

Glomerular Filtration Rate , Glucose Level 98, Calcium Level 9.0


Height (Feet):  5


Height (Inches):  1.00


Weight (Pounds):  95


General Appearance:  lethargic, confused


EENT:  normal ENT inspection


Neck:  normal alignment


Cardiovascular:  normal peripheral pulses, normal rate, regular rhythm


Respiratory/Chest:  chest wall non-tender, lungs clear, normal breath sounds


Abdomen:  normal bowel sounds, non tender, soft


Extremities:  normal inspection


Edema:  no edema noted Arm (L), no edema noted Arm (R), no edema noted Leg (L), 

no edema noted Leg (R), no edema noted Pedal (L), no edema noted Pedal (R), no 

edema noted Generalized


Neurologic:  motor weakness


Skin:  normal pigmentation, warm/dry











Chino Pineda DO Sep 27, 2019 08:37

## 2019-09-27 NOTE — NUR
NURSE NOTES:

patient discharged to [facility] with stable condition. no respiratory 
distress noted. no c/o pain at this time. disconnected GT feeding. flushed with water. 
removed IV and ID band. provided dc packet to ambulance personnel. checked and counted 
belongings with daughter. emptied urine bag. flushed f/c.

## 2019-09-27 NOTE — PULMONOLOGY PROGRESS NOTE
Assessment/Plan


Problems:  


(1) Failure to thrive


(2) At high risk for aspiration


(3) Diabetes


(4) Protein-calorie malnutrition, severe


(5) Limited mobility in bed


(6) Flexion contractures


Assessment/Plan


all family members are at bed site. 


aspiration precaution


symptomatic treatment


sliding scale


diabetic diet


check electrolytes


titrate fio2 to sat of 92%\


 


PEG is done, tolerating very well





Subjective


ROS Limited/Unobtainable:  No


Constitutional:  Reports: no symptoms


HEENT:  Repors: no symptoms


Allergies:  


Coded Allergies:  


     LISINOPRIL (Verified  Allergy, Unknown, 9/14/19)





Objective





Last 24 Hour Vital Signs








  Date Time  Temp Pulse Resp B/P (MAP) Pulse Ox O2 Delivery O2 Flow Rate FiO2


 


9/27/19 09:11    118/54    


 


9/27/19 09:00      Room Air  


 


9/27/19 08:00 97.7 62 19 118/54 (75) 99   


 


9/27/19 04:00 97.9 60 18 114/50 (71) 100   


 


9/27/19 00:00 98.3 66 18 106/50 (68) 97   


 


9/26/19 21:00      Room Air  


 


9/26/19 20:00 98.6 70 18 129/55 (79) 99   


 


9/26/19 16:00 97.2 61 19 108/83 (91) 100   

















Intake and Output  


 


 9/26/19 9/27/19





 18:59 06:59


 


Intake Total 550 ml 930 ml


 


Output Total 400 ml 750 ml


 


Balance 150 ml 180 ml


 


  


 


Intake Oral 100 ml 


 


Free Water 400 ml 330 ml


 


Tube Feeding 50 ml 600 ml


 


Output Urine Total 400 ml 750 ml


 


# Bowel Movements  1








Objective


General Appearance:  cachectic 


HEENT:  normocephalic, atraumatic


Respiratory/Chest:  chest wall non-tender, lungs clear


Breasts:  no masses


Cardiovascular:  normal peripheral pulses


Abdomen:  normal bowel sounds, soft, non tender


Genitourinary:  normal external genitalia


Extremities:  no clubbing


Neurologic/Psychiatric:  CNs II-XII grossly normal


Lymphatic:  no neck adenopathy


Laboratory Tests


9/27/19 06:00: 


White Blood Count 7.6, Red Blood Count 4.37, Hemoglobin 13.2, Hematocrit 40.2, 

Mean Corpuscular Volume 92, Mean Corpuscular Hemoglobin 30.2, Mean Corpuscular 

Hemoglobin Concent 32.8, Red Cell Distribution Width 12.4, Platelet Count 223, 

Mean Platelet Volume 6.8, Neutrophils (%) (Auto) 68.3, Lymphocytes (%) (Auto) 

19.2L, Monocytes (%) (Auto) 11.2H, Eosinophils (%) (Auto) 1.0, Basophils (%) (

Auto) 0.4, Sodium Level 139, Potassium Level 4.1, Chloride Level 102, Carbon 

Dioxide Level 32, Anion Gap 5, Blood Urea Nitrogen 17, Creatinine 0.5L, Estimat 

Glomerular Filtration Rate , Glucose Level 98, Calcium Level 9.0





Current Medications








 Medications


  (Trade)  Dose


 Ordered  Sig/Trent


 Route


 PRN Reason  Start Time


 Stop Time Status Last Admin


Dose Admin


 


 Acetaminophen


  (Tylenol)  650 mg  Q4H  PRN


 ORAL


 fever  9/15/19 08:30


 10/15/19 08:29  9/26/19 20:46


 


 


 Clonidine HCl


  (Catapres Tab)  0.1 mg  Q6H  PRN


 ORAL


 sbp above 160  9/15/19 11:15


 10/15/19 11:14  9/23/19 09:58


 


 


 Dextrose


  (Dextrose 50%)  25 ml  Q30M  PRN


 IV


 Hypoglycemia  9/15/19 08:30


 10/15/19 08:29   


 


 


 Dextrose


  (Dextrose 50%)  50 ml  Q30M  PRN


 IV


 Hypoglycemia  9/15/19 08:30


 10/15/19 08:29   


 


 


 Dronabinol


  (Marinol)  2.5 mg  TID


 ORAL


   9/17/19 13:00


 10/17/19 12:59  9/27/19 09:11


 


 


 Heparin Sodium


  (Porcine)


  (Heparin 5000


 units/ml)  5,000 units  EVERY 12  HOURS


 SUBQ


   9/15/19 09:00


 10/15/19 08:59  9/27/19 09:13


 


 


 Insulin Aspart


  (NovoLOG)    BEFORE MEALS AND  HS


 SUBQ


   9/15/19 11:30


 10/15/19 11:29  9/27/19 12:05


 


 


 Losartan Potassium


  (Cozaar)  25 mg  DAILY


 ORAL


   9/15/19 12:00


 10/15/19 11:59  9/27/19 09:11


 


 


 Ondansetron HCl


  (Zofran)  4 mg  Q6H  PRN


 IVP


 Nausea & Vomiting  9/15/19 08:30


 10/15/19 08:29   


 


 


 Polyethylene


 Glycol


  (Miralax)  17 gm  HSPRN  PRN


 ORAL


 Constipation  9/15/19 08:30


 10/15/19 08:29   


 


 


 Vancomycin HCl


  (Firvanq)  125 mg  FOUR TIMES A  DAY


 ORAL


   9/17/19 13:00


 9/27/19 23:59  9/27/19 09:11


 

















Chau Fitzgerald MD Sep 27, 2019 12:13

## 2019-09-29 NOTE — DISCHARGE SUMMARY
Discharge Summary


Discharge Summary


_


DATE OF ADMISSION: 09/14/2019


DATE OF DISCHARGE: 09/27/2019





DISCHARGED BY: Dr. Chino Pineda





CONSULTANTS: 


Dr. Chau Carr





BRIEF HOSPITAL COURSE:


Patient is an 84-year-old female, presented to ED for evaluation of failure to 

thrive, decreased appetite and possible fever the nursing home.  Patient was 

nonverbal at baseline.  Unable to provide any additional history.  She has 

medical history significant for diabetes mellitus, hypertension, asthma.





Upon evaluation at the ED, patient had a low-grade fever of 99.5.  She was 

bradycardic with heart rate of 52.  EKG revealed sinus bradycardia, no changes.

  Blood work did not show any leukocytosis.  Hemoglobin and hematocrit were 

stable.  Chemistry stable.  Renal function stable.  Glucose 95.  Lactic acid 

0.8.  proBNP 501.  Albumin 3.2.  Chest x-ray did not show any acute pathology.  

Urinalysis revealed pyuria with moderate bacteria.  She was then admitted for 

evaluation of UTI, cachexia, and bradycardia.





She was admitted to medical floor.  She was given gentle IV fluid hydration.  

She was started empirically on ceftriaxone.  She was given Cozaar for blood 

pressure management.  She was placed on pulmonary support.  She was given 

nebulizer treatment.  She was placed on precaution.  She was given bowel 

regimen.





GI was consulted.  Swallow evaluation was ordered.  She was assessed for need 

for G-tube.  She was placed on calorie count.





Patient had positive urinalysis and urine culture showed growth of E. coli.  

She developed diarrhea and C. difficile toxin was positive.  ID was consulted.  

She was continued on ceftriaxone.  She was started on p.o. vancomycin.





The calorie count showed nutritional needs are not being met.  Family initially 

refused PEG placement.  





Neurologic evaluation.  Patient has diffuse multifocal encephalopathy probably 

metabolic superimposed on dementia, probably Alzheimer's disease.  Family does 

not know why she cannot walk.  





Family consented to PEG tube placement.  On 09/25/2019, she underwent upper 

endoscopy with PEG tube placement.  EGD showed evidence of diffuse gastritis.  

No evidence of any active ulceration or bleeding.





Diarrhea was resolving.  She was tolerating G-tube feeding.  She was eventually 

discharged back to nursing home.





FINAL DIAGNOSES: 


Failure to thrive


C. difficile diarrhea.


Urinary infection with E. coli.


Acute metabolic encephalopathy due to UTI


High risk for aspiration


Severe protein calorie malnutrition


Status post EGD with PEG tube placement


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


Diabetes mellitus








DISPOSITION: DC to [facility].





DISCHARGE MEDICATIONS: Refer to Discharge Medication List.





I have been assigned to complete a discharge summary on this account, I was not 

involved with the patient's management.--EMILE Francis Jacqueline Robles NP Sep 29, 2019 17:36